# Patient Record
Sex: MALE | Race: WHITE | URBAN - METROPOLITAN AREA
[De-identification: names, ages, dates, MRNs, and addresses within clinical notes are randomized per-mention and may not be internally consistent; named-entity substitution may affect disease eponyms.]

---

## 2019-07-22 ENCOUNTER — OFFICE VISIT (OUTPATIENT)
Dept: PULMONOLOGY | Facility: CLINIC | Age: 44
End: 2019-07-22

## 2019-07-22 VITALS
SYSTOLIC BLOOD PRESSURE: 135 MMHG | DIASTOLIC BLOOD PRESSURE: 90 MMHG | HEART RATE: 80 BPM | HEIGHT: 71 IN | OXYGEN SATURATION: 98 % | RESPIRATION RATE: 18 BRPM | WEIGHT: 255 LBS | TEMPERATURE: 97.6 F | BODY MASS INDEX: 35.7 KG/M2

## 2019-07-22 DIAGNOSIS — E66.01 SEVERE OBESITY (HCC): ICD-10-CM

## 2019-07-22 DIAGNOSIS — J45.909 ASTHMA, UNSPECIFIED ASTHMA SEVERITY, UNSPECIFIED WHETHER COMPLICATED, UNSPECIFIED WHETHER PERSISTENT: Primary | ICD-10-CM

## 2019-07-22 DIAGNOSIS — G47.33 OSA (OBSTRUCTIVE SLEEP APNEA): ICD-10-CM

## 2019-07-22 RX ORDER — TRIAMCINOLONE ACETONIDE 1 MG/G
OINTMENT TOPICAL
COMMUNITY
Start: 2012-11-09 | End: 2022-09-12 | Stop reason: ALTCHOICE

## 2019-07-22 RX ORDER — IBUPROFEN 200 MG
800 TABLET ORAL
COMMUNITY

## 2019-07-22 RX ORDER — RANITIDINE 300 MG/1
TABLET ORAL 2 TIMES DAILY
COMMUNITY
End: 2022-09-12 | Stop reason: ALTCHOICE

## 2019-07-22 RX ORDER — ERGOCALCIFEROL 1.25 MG/1
CAPSULE ORAL
Refills: 1 | COMMUNITY
Start: 2019-07-15 | End: 2022-09-12 | Stop reason: ALTCHOICE

## 2019-07-22 RX ORDER — INSULIN DEGLUDEC INJECTION 200 U/ML
INJECTION, SOLUTION SUBCUTANEOUS
Refills: 2 | COMMUNITY
Start: 2019-06-25 | End: 2022-09-12 | Stop reason: ALTCHOICE

## 2019-07-22 RX ORDER — METFORMIN HYDROCHLORIDE 1000 MG/1
TABLET ORAL
Refills: 0 | COMMUNITY
Start: 2019-06-20 | End: 2022-09-12 | Stop reason: ALTCHOICE

## 2019-07-22 RX ORDER — SIMVASTATIN 40 MG/1
TABLET, FILM COATED ORAL
Refills: 0 | COMMUNITY
Start: 2019-06-20 | End: 2022-09-12 | Stop reason: SDUPTHER

## 2019-07-22 RX ORDER — ALBUTEROL SULFATE 90 UG/1
2 AEROSOL, METERED RESPIRATORY (INHALATION)
COMMUNITY
Start: 2014-01-03 | End: 2022-09-12 | Stop reason: SDUPTHER

## 2019-07-22 RX ORDER — BUPROPION HYDROCHLORIDE 150 MG/1
TABLET ORAL
Refills: 4 | COMMUNITY
Start: 2019-06-25 | End: 2022-09-12 | Stop reason: SDUPTHER

## 2019-07-22 RX ORDER — LISINOPRIL AND HYDROCHLOROTHIAZIDE 20; 25 MG/1; MG/1
TABLET ORAL
Refills: 0 | COMMUNITY
Start: 2019-06-20 | End: 2022-09-12 | Stop reason: ALTCHOICE

## 2019-07-22 NOTE — PROGRESS NOTES
[facility]  Pulmonary, Critical Care, and Sleep Medicine       Pulmonary Office Progress Notes        Subjective: The patient presents for evaluation of probable obstructive pulmonary disease and obstructive sleep apnea. History  The patient is a 27-year-old male that started smoking at age 13. He continues to smoke to the present day. Over most of these 32 years he smoked 1 pack/day. He started using his first inhaler at age 13. If he was sick, all he used was an albuterol nebulizer. He was briefly on inhaled steroid, but did not care for the way that it tasted. He stopped it. He has been sick with a lower respiratory tract infection requiring prednisone or similar therapy about once every 6 years. He is currently quite content with just using albuterol as needed. He denies any cough, purulent sputum production or hemoptysis. He occasionally wheezes. Triggers for shortness of breath include pollens, irritants, and changes in the weather. He does have frequent upper respiratory tract congestion that appears to be allergy driven. He denies a regular sore throat. He has no chest pains or dizziness. Denies any gastrointestinal or genitourinary tract complaints. He has no lower extremity edema. He pays close attention to his feet and legs because of his history of diabetes. He works as a  and is on his feet for several hours a day. He denies any shortness of breath despite walking several miles. He does not own a car and walks wherever he needs to go. He denies any orthopnea or PND. He does waken regularly at night. He has obstructive sleep apnea and recalls being told that he has 68 events per hour. At one point he did not breathe for 30 seconds. He does not have his machine because he wore it only 3 hours and 45 minutes at night and his insurance company refused to pay for the device.   The issue sounds to be more 1 of machine comfort than an unwillingness to use the device    Review of systems  Denies any neurologic deficits. He has no palpable adenopathy. He has occasional itching of his feet when his glucose is poorly controlled. He has no fevers or night sweats or unexplained weight loss. The review was completed in its entirety and is otherwise normal.    Past medical history  Tobacco abuse  Hypertension  Diabetes mellitus  Obesity  Hypercholesterolemia  Asthma  Depression    Current Outpatient Medications on File Prior to Visit   Medication Sig Dispense Refill    metFORMIN (GLUCOPHAGE) 1,000 mg tablet TAKE 1 TABLET BY MOUTH TWICE DAILY  0    TRESIBA FLEXTOUCH U-200 200 unit/mL (3 mL) inpn INJECT 80 100 UNITS SUBCUTANEOUSLY AT BEDTIME. ADJUST AS DIRECTED  2    lisinopril-hydroCHLOROthiazide (PRINZIDE, ZESTORETIC) 20-25 mg per tablet TAKE 1 TABLET BY MOUTH ONCE DAILY FOR BLOOD PRESSURE  0    simvastatin (ZOCOR) 40 mg tablet TAKE 1 TABLET BY MOUTH AT BEDTIME FOR CHOLESTEROL  0    buPROPion XL (WELLBUTRIN XL) 150 mg tablet TAKE 1 TABLET BY MOUTH ONCE DAILY IN THE MORNING  4    ergocalciferol (ERGOCALCIFEROL) 50,000 unit capsule TAKE 1 CAPSULE BY MOUTH EVERY WEEK  1    albuterol (PROVENTIL HFA) 90 mcg/actuation inhaler Take 2 Puffs by inhalation.  triamcinolone acetonide (KENALOG) 0.1 % ointment Apply  to affected area.  semaglutide (OZEMPIC) 0.25 mg/0.2 mL (2 mg/1.5 mL) sub-q pen by SubCUTAneous route every seven (7) days.  raNITIdine (ZANTAC) 300 mg tab Take  by mouth two (2) times a day.  ibuprofen (MOTRIN) 200 mg tablet Take 800 mg by mouth every six (6) hours as needed for Pain. No current facility-administered medications on file prior to visit. Social history  Works as a . Does not own a car and walks wherever he needs to go. He has a 32-pack-year history of smoking. Family history  His daughter has asthma.        Objective:     He is alert, oriented and in no respiratory distress at rest. Affect is normal  Blood pressure 135/90, pulse 80, temperature 97.6 °F (36.4 °C), temperature source Oral, resp. rate 18, height 5' 11\" (1.803 m), weight 115.7 kg (255 lb), SpO2 98 %. Extraocular muscles are intact. Gaze is conjugate. Oral mucosa is moist.  Dentition is in good repair  Neck is supple and there is no lymphadenopathy  Breath sounds are diminished but clear. Heart with regular rate and rhythm. No appreciable murmur or gallop, but tones are distant. Sclera anicteric. Obese and intra-abdominal contents cannot be reliably palpated  No cyanosis, clubbing or edema  No facial rash or changes of the hands or wrists concerning for an inflammatory arthropathy    Spirometry 7/22/2019  The FEV1 pre-bronchodilators is normal at 3.40 L or 81% predicted. There is no change post bronchodilators. The FVC post bronchodilators is 4.82 L or 90% predicted. The FEV1/FVC ratio is normal.  The FEF 25-75% is reduced to 64%. There is 3% improvement post bronchodilators. Flow volume loop is normal.    Chest x-ray from 7/18/2019 on the lateral view shows a flattened diaphragm with increased retrosternal airspace. The PA view shows a normal-sized heart. The costophrenic angles appear sharp. There are no clear-cut abnormalities, although there may be slightly nodular infiltrate in the right lower lobe. Assessment  Asthma  Daily use of rescue inhaler  On no controlling medication  Obstructive sleep apnea off therapy due to patient removing the mask while sleeping  Tobacco abuse  Allergic rhinitis  Metabolic syndrome    Although the patient's respiratory symptoms could use further controlling therapies, I do not believe that he is willing to add medications. He does appear to be motivated to treat his obstructive sleep apnea. If postnasal drip appears to cause difficulties with controlling his asthma symptoms or using an AutoPAP machine, he should start a nasal steroid.     Plan:  AutoPap  Continue albuterol  Hold ICS due to patient request  Encourage smoking cessation. Currently the patient does not appear to be sufficiently motivated to stop    The patient will return to clinic in 6 weeks.

## 2019-07-22 NOTE — PROGRESS NOTES
Verbal Order with read back per Laura Monae MD  For PFT smart panel. AMB POC PFT complete w/ bronchodilator  AMB POC PFT complete w/o bronchodilator    Dr. Chano Hill MD will co-sign the orders.

## 2022-03-19 PROBLEM — E66.01 SEVERE OBESITY (HCC): Status: ACTIVE | Noted: 2019-07-22

## 2022-09-01 NOTE — PROGRESS NOTES
Mary Faye (: 1975) is a 48 y.o. male, NEW TO PROVIDER, here to establish care for:    ICD-10-CM ICD-9-CM   1. Establishing care with new doctor, encounter for  Z76.89 V65.8   2. HTN, goal below 130/80  I10 401.9   3. Dyslipidemia, goal LDL below 70  E78.5 272.4   4. Need for hepatitis C screening test  Z11.59 V73.89   5. Cough  R05.9 786.2   6. Shortness of breath  R06.02 786.05   7. MARY (obstructive sleep apnea)  G47.33 327.23   8. Annual physical exam  Z00.00 V70.0   9. Insulin dependent type 2 diabetes mellitus (HCC)  E11.9 250.00    Z79.4 V58.67   10. Moderate episode of recurrent major depressive disorder (HCC)  F33.1 296.32   11. Moderate persistent asthma without complication  K38.80 574.08   12. Tobacco use disorder  F17.200 305.1     Assessment   Plan     #Ran out of meds and has been without care or treatment ~2 years    #HTN - at goal, not on treatment  BP cuff at home? No   Given well controlled without treatment, will monitor ; Previously on Lisinopril-HCTZ 20-25mg without issue    BP Readings from Last 3 Encounters:   22 104/69   19 135/90     Key CAD CHF Meds               simvastatin (ZOCOR) 40 mg tablet (Taking) TAKE 1 TABLET BY MOUTH AT BEDTIME FOR CHOLESTEROL          #HLD - Goal LDL < 70  Will restart prior treatment of Simvastatin 40mg  Unclear control, will check levels    #IDDM2 - Goal A1C <7  Reports last A1C 10.4 (2 years ago). Unclear control, will check levels  Glucometer? Has at home; Average 200-300  Hypoglycemia? Denies  Agreeable to restart GLP agonist, Long acting insulin, and Metformin with plan to titrate up GLP and Insulin at 1mo follow up; Could not remember prior insulin dosing so plan for 10units qHS  Encouraged to have annual foot exam to r/o peripheral neuropathy. Encouraged to update dilated eye exam annually to r/o retinopathy.     Key Antihyperglycemic Medications               insulin glargine (LANTUS,BASAGLAR) 100 unit/mL (3 mL) inpn (Taking) 10 Units by SubCUTAneous route nightly. Indications: type 2 diabetes mellitus    metFORMIN ER (GLUCOPHAGE XR) 500 mg tablet (Taking) Take 2 Tablets by mouth daily (with dinner). Indications: type 2 diabetes mellitus    semaglutide (OZEMPIC) 0.25 mg or 0.5 mg/dose (2 mg/1.5 ml) subq pen (Taking) 0.25 mg by SubCUTAneous route every seven (7) days. Indications: type 2 diabetes mellitus          #? Mild persistent asthma - uncontrolled vs COPD  With #Tobacco use disorder - contemplative  Reviewed that the best rates of smoking cessation success are found with a combination of behavioral changes, medications, and nicotine replacement therapy. Encouraged to follow up for assistance if interested. In review of medications, reports using rescue inhaler three times daily. Reviewed optimal use of preventative and rescue inhalers. Reviewed to follow up if use of rescue inhaler (albuterol) continues to be twice weekly or more. Agreeable to start ICS, will titrate up to reach goal     Key COPD Medications               albuterol (PROVENTIL HFA, VENTOLIN HFA, PROAIR HFA) 90 mcg/actuation inhaler (Taking) Take 2 Puffs by inhalation every four to six (4-6) hours as needed for Wheezing or Shortness of Breath. budesonide (PULMICORT FLEXHALER) 90 mcg/actuation aepb inhaler (Taking) Take 1 Puff by inhalation two (2) times a day. Max 1,000 mcg/day  Indications: maintenance breathing treatment          #Hx of MARY severe in need of CPAP  Reviewed signs/sx indicating evaluation for sleep study needed and agreeable to referral, ordered 9/12/22. Discussed treatment options including weight loss efforts up to use of CPAP if indicated. #MDD with #KRYSTIAN  Current regimen and adjustments: Reviewed goal to avoid changing multiple psychiatric medications at a time to determine which are helping/having AE. Interested in restarting Wellbutrin to reduce frequency/severity of symptoms.  Reviewed risk/benefit of both preventative daily treatment and rescue (prn) medication. Reviewed that it could take 4-6wks before improvement noted after initiation of preventative daily medication and likely need to increase dose if tolerating well to reach therapeutic level/treatment goals. Counseled on nonpharmacologic interventions that could improve outcomes as well including exercise, therapy/counseling, and self care. Encouraged to establish care with Psychiatry services for counseling. HM:  Lung CA screening indicated at 49y/o - Reviewed risk/benefit of annual screening for lung cancer with low-dose computed tomography in adults aged 48 to 80 years who have a 20 pack-year smoking history and currently smoke or have quit within the past 15 years. Patient agreeable to screening          Orders Placed This Encounter    XR CHEST PA LAT     Standing Status:   Future     Number of Occurrences:   1     Standing Expiration Date:   9/12/2023     Order Specific Question:   Reason for Exam     Answer:   chronic cough, SOB     Order Specific Question:   Which facility to perform procedure?      Answer:   [facility]    HEPATITIS C AB     Standing Status:   Future     Standing Expiration Date:   9/1/2023    HEMOGLOBIN A1C WITH EAG     Standing Status:   Future     Standing Expiration Date:   9/12/2023    CBC WITH AUTOMATED DIFF     Standing Status:   Future     Standing Expiration Date:   1/91/1433    METABOLIC PANEL, COMPREHENSIVE     Standing Status:   Future     Standing Expiration Date:   9/12/2023    T4, FREE     Standing Status:   Future     Standing Expiration Date:   9/12/2023    LIPID PANEL     Standing Status:   Future     Standing Expiration Date:   9/12/2023    TSH 3RD GENERATION     Standing Status:   Future     Standing Expiration Date:   9/12/2023    VITAMIN D, 25 HYDROXY     Standing Status:   Future     Standing Expiration Date:   9/12/2023    SLEEP MEDICINE REFERRAL     Referral Priority:   Routine     Referral Type:   Consultation     Referral Reason:   Specialty Services Required     Number of Visits Requested:   1    insulin glargine (LANTUS,BASAGLAR) 100 unit/mL (3 mL) inpn     Sig: 10 Units by SubCUTAneous route nightly. Indications: type 2 diabetes mellitus     Dispense:  3 Adjustable Dose Pre-filled Pen Syringe     Refill:  0     Will adjust next Rx; Restarting insulin    metFORMIN ER (GLUCOPHAGE XR) 500 mg tablet     Sig: Take 2 Tablets by mouth daily (with dinner). Indications: type 2 diabetes mellitus     Dispense:  180 Tablet     Refill:  0     Will increase to 2000mg daily with refill    semaglutide (OZEMPIC) 0.25 mg or 0.5 mg/dose (2 mg/1.5 ml) subq pen     Si.25 mg by SubCUTAneous route every seven (7) days. Indications: type 2 diabetes mellitus     Dispense:  1 Box     Refill:  0     Will increase dose with next refill    albuterol (PROVENTIL HFA, VENTOLIN HFA, PROAIR HFA) 90 mcg/actuation inhaler     Sig: Take 2 Puffs by inhalation every four to six (4-6) hours as needed for Wheezing or Shortness of Breath. Dispense:  18 g     Refill:  5    simvastatin (ZOCOR) 40 mg tablet     Sig: TAKE 1 TABLET BY MOUTH AT BEDTIME FOR CHOLESTEROL     Dispense:  90 Tablet     Refill:  3    buPROPion XL (WELLBUTRIN XL) 150 mg tablet     Sig: Take 1 Tablet by mouth daily. in the morning  Indications: major depressive disorder, stop smoking     Dispense:  90 Tablet     Refill:  1    budesonide (PULMICORT FLEXHALER) 90 mcg/actuation aepb inhaler     Sig: Take 1 Puff by inhalation two (2) times a day. Max 1,000 mcg/day  Indications: maintenance breathing treatment     Dispense:  1 Each     Refill:  0     New rx, may adjust dose with next refill     Return in about 1 month (around 10/12/2022) for 30 min follow up, BP check, review results, adjust diabetes meds . Subjective   Extensive review of medical history and medications completed to facilitate transfer of care.         Current Outpatient Medications   Medication Instructions    albuterol (PROVENTIL HFA, VENTOLIN HFA, PROAIR HFA) 90 mcg/actuation inhaler 2 Puffs, Inhalation, EVERY 4-6 HOURS PRN    budesonide (PULMICORT FLEXHALER) 90 mcg/actuation aepb inhaler 1 Puff, Inhalation, 2 TIMES DAILY, Max 1,000 mcg/day    buPROPion XL (WELLBUTRIN XL) 150 mg, Oral, DAILY, in the morning    ibuprofen (MOTRIN) 800 mg, Oral, EVERY 6 HOURS AS NEEDED    insulin glargine (LANTUS,BASAGLAR) 10 Units, SubCUTAneous, EVERY BEDTIME    metFORMIN ER (GLUCOPHAGE XR) 1,000 mg, Oral, DAILY WITH DINNER    semaglutide (OZEMPIC) 0.25 mg, SubCUTAneous, EVERY 7 DAYS    simvastatin (ZOCOR) 40 mg tablet TAKE 1 TABLET BY MOUTH AT BEDTIME FOR CHOLESTEROL      Allergies   Allergen Reactions    Milk Containing Products Other (comments)     Known since a child, no known reaction      Objective   Visit Vitals  /69 (BP 1 Location: Right arm, BP Patient Position: Sitting, BP Cuff Size: Adult)   Pulse 96   Temp 97.3 °F (36.3 °C) (Temporal)   Resp 18   Ht 5' 11\" (1.803 m)   Wt 212 lb 12.8 oz (96.5 kg)   SpO2 96%   BMI 29.68 kg/m²     Physical Exam  Vitals and nursing note reviewed. Constitutional:       General: He is not in acute distress. Interventions: Face mask in place. Cardiovascular:      Rate and Rhythm: Normal rate. Pulses: Normal pulses. Pulmonary:      Effort: Pulmonary effort is normal. No respiratory distress. Neurological:      Mental Status: He is alert and oriented to person, place, and time.       Gait: Gait normal.   Psychiatric:         Mood and Affect: Mood normal.         Behavior: Behavior normal.          Moshe Mohan DO  Family Medicine  09/12/2022

## 2022-09-12 ENCOUNTER — HOSPITAL ENCOUNTER (OUTPATIENT)
Dept: LAB | Age: 47
Discharge: HOME | End: 2022-09-12
Payer: COMMERCIAL

## 2022-09-12 ENCOUNTER — APPOINTMENT (OUTPATIENT)
Dept: FAMILY MEDICINE CLINIC | Age: 47
End: 2022-09-12

## 2022-09-12 ENCOUNTER — OFFICE VISIT (OUTPATIENT)
Dept: FAMILY MEDICINE CLINIC | Age: 47
End: 2022-09-12
Payer: COMMERCIAL

## 2022-09-12 VITALS
WEIGHT: 212.8 LBS | DIASTOLIC BLOOD PRESSURE: 69 MMHG | HEART RATE: 96 BPM | HEIGHT: 71 IN | BODY MASS INDEX: 29.79 KG/M2 | OXYGEN SATURATION: 96 % | TEMPERATURE: 97.3 F | RESPIRATION RATE: 18 BRPM | SYSTOLIC BLOOD PRESSURE: 104 MMHG

## 2022-09-12 DIAGNOSIS — Z79.4 INSULIN DEPENDENT TYPE 2 DIABETES MELLITUS (HCC): ICD-10-CM

## 2022-09-12 DIAGNOSIS — R06.02 SHORTNESS OF BREATH: ICD-10-CM

## 2022-09-12 DIAGNOSIS — E11.9 INSULIN DEPENDENT TYPE 2 DIABETES MELLITUS (HCC): ICD-10-CM

## 2022-09-12 DIAGNOSIS — J45.40 MODERATE PERSISTENT ASTHMA WITHOUT COMPLICATION: ICD-10-CM

## 2022-09-12 DIAGNOSIS — I10 HTN, GOAL BELOW 130/80: ICD-10-CM

## 2022-09-12 DIAGNOSIS — F17.200 TOBACCO USE DISORDER: ICD-10-CM

## 2022-09-12 DIAGNOSIS — Z00.00 ANNUAL PHYSICAL EXAM: ICD-10-CM

## 2022-09-12 DIAGNOSIS — E78.5 DYSLIPIDEMIA, GOAL LDL BELOW 70: ICD-10-CM

## 2022-09-12 DIAGNOSIS — Z76.89 ESTABLISHING CARE WITH NEW DOCTOR, ENCOUNTER FOR: Primary | ICD-10-CM

## 2022-09-12 DIAGNOSIS — G47.33 OSA (OBSTRUCTIVE SLEEP APNEA): ICD-10-CM

## 2022-09-12 DIAGNOSIS — Z11.59 NEED FOR HEPATITIS C SCREENING TEST: ICD-10-CM

## 2022-09-12 DIAGNOSIS — F33.1 MODERATE EPISODE OF RECURRENT MAJOR DEPRESSIVE DISORDER (HCC): ICD-10-CM

## 2022-09-12 DIAGNOSIS — R05.9 COUGH: ICD-10-CM

## 2022-09-12 LAB
25(OH)D3 SERPL-MCNC: 15.9 NG/ML (ref 30–100)
ALBUMIN SERPL-MCNC: 4.1 G/DL (ref 3.4–5)
ALBUMIN/GLOB SERPL: 1.2 {RATIO} (ref 0.8–1.7)
ALP SERPL-CCNC: 70 U/L (ref 45–117)
ALT SERPL-CCNC: 26 U/L (ref 16–61)
ANION GAP SERPL CALC-SCNC: 7 MMOL/L (ref 3–18)
AST SERPL-CCNC: 13 U/L (ref 10–38)
BASOPHILS # BLD: 0.1 K/UL (ref 0–0.1)
BASOPHILS NFR BLD: 2 % (ref 0–2)
BILIRUB SERPL-MCNC: 1 MG/DL (ref 0.2–1)
BUN SERPL-MCNC: 15 MG/DL (ref 7–18)
BUN/CREAT SERPL: 10 (ref 12–20)
CALCIUM SERPL-MCNC: 9.9 MG/DL (ref 8.5–10.1)
CHLORIDE SERPL-SCNC: 100 MMOL/L (ref 100–111)
CHOLEST SERPL-MCNC: 234 MG/DL
CO2 SERPL-SCNC: 27 MMOL/L (ref 21–32)
CREAT SERPL-MCNC: 1.52 MG/DL (ref 0.6–1.3)
DIFFERENTIAL METHOD BLD: ABNORMAL
EOSINOPHIL # BLD: 0.5 K/UL (ref 0–0.4)
EOSINOPHIL NFR BLD: 5 % (ref 0–5)
ERYTHROCYTE [DISTWIDTH] IN BLOOD BY AUTOMATED COUNT: 12.1 % (ref 11.6–14.5)
EST. AVERAGE GLUCOSE BLD GHB EST-MCNC: 252 MG/DL
GLOBULIN SER CALC-MCNC: 3.5 G/DL (ref 2–4)
GLUCOSE SERPL-MCNC: 380 MG/DL (ref 74–99)
HBA1C MFR BLD: 10.4 % (ref 4.2–5.6)
HCT VFR BLD AUTO: 54.6 % (ref 36–48)
HDLC SERPL-MCNC: 39 MG/DL (ref 40–60)
HDLC SERPL: 6 {RATIO} (ref 0–5)
HGB BLD-MCNC: 18.3 G/DL (ref 13–16)
IMM GRANULOCYTES # BLD AUTO: 0.1 K/UL (ref 0–0.04)
IMM GRANULOCYTES NFR BLD AUTO: 1 % (ref 0–0.5)
LDLC SERPL CALC-MCNC: 122.8 MG/DL (ref 0–100)
LIPID PROFILE,FLP: ABNORMAL
LYMPHOCYTES # BLD: 1.8 K/UL (ref 0.9–3.6)
LYMPHOCYTES NFR BLD: 19 % (ref 21–52)
MCH RBC QN AUTO: 32.3 PG (ref 24–34)
MCHC RBC AUTO-ENTMCNC: 33.5 G/DL (ref 31–37)
MCV RBC AUTO: 96.3 FL (ref 78–100)
MONOCYTES # BLD: 0.8 K/UL (ref 0.05–1.2)
MONOCYTES NFR BLD: 9 % (ref 3–10)
NEUTS SEG # BLD: 6.3 K/UL (ref 1.8–8)
NEUTS SEG NFR BLD: 65 % (ref 40–73)
NRBC # BLD: 0 K/UL (ref 0–0.01)
NRBC BLD-RTO: 0 PER 100 WBC
PLATELET # BLD AUTO: 255 K/UL (ref 135–420)
PMV BLD AUTO: 10.2 FL (ref 9.2–11.8)
POTASSIUM SERPL-SCNC: 5 MMOL/L (ref 3.5–5.5)
PROT SERPL-MCNC: 7.6 G/DL (ref 6.4–8.2)
RBC # BLD AUTO: 5.67 M/UL (ref 4.35–5.65)
SODIUM SERPL-SCNC: 134 MMOL/L (ref 136–145)
T4 FREE SERPL-MCNC: 1.1 NG/DL (ref 0.7–1.5)
TRIGL SERPL-MCNC: 361 MG/DL (ref ?–150)
TSH SERPL DL<=0.05 MIU/L-ACNC: 1.13 UIU/ML (ref 0.36–3.74)
VLDLC SERPL CALC-MCNC: 72.2 MG/DL
WBC # BLD AUTO: 9.6 K/UL (ref 4.6–13.2)

## 2022-09-12 PROCEDURE — 86803 HEPATITIS C AB TEST: CPT

## 2022-09-12 PROCEDURE — 99204 OFFICE O/P NEW MOD 45 MIN: CPT | Performed by: STUDENT IN AN ORGANIZED HEALTH CARE EDUCATION/TRAINING PROGRAM

## 2022-09-12 PROCEDURE — 84439 ASSAY OF FREE THYROXINE: CPT

## 2022-09-12 PROCEDURE — 85025 COMPLETE CBC W/AUTO DIFF WBC: CPT

## 2022-09-12 PROCEDURE — 84443 ASSAY THYROID STIM HORMONE: CPT

## 2022-09-12 PROCEDURE — 83036 HEMOGLOBIN GLYCOSYLATED A1C: CPT

## 2022-09-12 PROCEDURE — 36415 COLL VENOUS BLD VENIPUNCTURE: CPT

## 2022-09-12 PROCEDURE — 82306 VITAMIN D 25 HYDROXY: CPT

## 2022-09-12 PROCEDURE — 80061 LIPID PANEL: CPT

## 2022-09-12 PROCEDURE — 80053 COMPREHEN METABOLIC PANEL: CPT

## 2022-09-12 RX ORDER — METFORMIN HYDROCHLORIDE 500 MG/1
1000 TABLET, EXTENDED RELEASE ORAL
Qty: 180 TABLET | Refills: 0 | Status: SHIPPED | OUTPATIENT
Start: 2022-09-12 | End: 2022-10-13 | Stop reason: DRUGHIGH

## 2022-09-12 RX ORDER — SIMVASTATIN 40 MG/1
TABLET, FILM COATED ORAL
Qty: 90 TABLET | Refills: 3 | Status: SHIPPED | OUTPATIENT
Start: 2022-09-12

## 2022-09-12 RX ORDER — ALBUTEROL SULFATE 90 UG/1
2 AEROSOL, METERED RESPIRATORY (INHALATION)
Qty: 18 G | Refills: 5 | Status: SHIPPED | OUTPATIENT
Start: 2022-09-12

## 2022-09-12 RX ORDER — INSULIN GLARGINE 100 [IU]/ML
10 INJECTION, SOLUTION SUBCUTANEOUS
Qty: 3 ADJUSTABLE DOSE PRE-FILLED PEN SYRINGE | Refills: 0 | Status: SHIPPED | OUTPATIENT
Start: 2022-09-12 | End: 2022-10-20 | Stop reason: SDUPTHER

## 2022-09-12 RX ORDER — BUPROPION HYDROCHLORIDE 150 MG/1
150 TABLET ORAL DAILY
Qty: 90 TABLET | Refills: 1 | Status: SHIPPED | OUTPATIENT
Start: 2022-09-12

## 2022-09-12 NOTE — PATIENT INSTRUCTIONS
The Big Picture: Checking Your Blood Sugar    Blood sugar (blood glucose) monitoring is the primary tool you have to find out if your blood glucose levels are within your target range. This tells you your blood glucose level at any one time. Its important for blood sugar levels to stay in a healthy range. If glucose levels get too low, we can lose the ability to think and function normally. If they get too high and stay high, it can cause damage or complications to the body over the course of many years. The logging of your results is vital. When you bring your log to your healthcare provider, youll  have a good picture of your body's response to your diabetes care plan. To help keep track of your levels, we have a printable blood glucose log. We also have a blood glucose log available for purchase that is smaller so you can carry it with you. Who should check? Talk to your doctor about whether you should be checking your blood glucose. People who may benefit from checking blood glucose regularly include those:  taking insulin. who are pregnant.  having a hard time controlling blood sugar levels. having low blood sugar levels. having low blood sugar levels without the usual warning signs. have ketones from high blood sugar levels. Other tips for checking:    With some meters, you can also use your forearm, thigh, or fleshy part of your hand. There are spring-loaded lancing devices that make sticking yourself less painful. If you use your fingertip, stick the side of your fingertip by your fingernail to avoid having sore spots on the frequently used part of your finger. What are the target ranges? A1C targets differ based on age and health.  Also, more or less stringent glycemic goals may be appropriate for each individual. Blood glucose targets are individualized based on:  duration of diabetes  age/life expectancy  conditions a person may have  cardiovascular disease or diabetes complications  hypoglycemia unawareness  individual patient considerations    The American Diabetes Association suggests the following targets for most nonpregnant adults with diabetes:    A1C: Less than 7%. Before a meal (preprandial plasma glucose):  mg/dL    1-2 hours after beginning of the meal (postprandial plasma glucose)*: Less than 180 mg/dL    What do my results mean? When you finish the blood sugar check, write down your results and note what factors may have affected them, such as food, activity, and stress. Take a close look at your blood glucose record to see if your level is too high or too low several days in a row at about the same time. If the same thing keeps happening, it might be time to change your diabetes care plan. Work with your doctor or diabetes educator to learn what your results mean for you. It can take time to make adjustments and get things just right. And do ask your doctor if you should report results out of a certain range right away by phone. Keep in mind that blood glucose results often trigger strong feelings. Blood sugar numbers can leave you upset, confused, frustrated, angry, or down. It's easy to use the numbers to  yourself.  Remind yourself that tracking your blood sugar level is simply a way to know how well your diabetes care plan is working, and whether that plan may need to change. : )

## 2022-09-12 NOTE — PROGRESS NOTES
Michael Davidson is a 48 y.o. male (: 1975) presenting to address:    Chief Complaint   Patient presents with    Establish Care       Vitals:    22 1256   BP: 104/69   Pulse: 96   Resp: 18   Temp: 97.3 °F (36.3 °C)   TempSrc: Temporal   SpO2: 96%   Weight: 212 lb 12.8 oz (96.5 kg)   Height: 5' 11\" (1.803 m)   PainSc:   0 - No pain       Hearing/Vision:   No results found. Learning Assessment:     Learning Assessment 2022   PRIMARY LEARNER Patient   HIGHEST LEVEL OF EDUCATION - PRIMARY LEARNER  SOME COLLEGE   BARRIERS PRIMARY LEARNER NONE   CO-LEARNER CAREGIVER No   PRIMARY LANGUAGE ENGLISH   LEARNER PREFERENCE PRIMARY DEMONSTRATION   ANSWERED BY self   RELATIONSHIP SELF     Depression Screening:     3 most recent PHQ Screens 2022   Little interest or pleasure in doing things Not at all   Feeling down, depressed, irritable, or hopeless Several days   Total Score PHQ 2 1   Trouble falling or staying asleep, or sleeping too much Nearly every day   Feeling tired or having little energy Not at all   Poor appetite, weight loss, or overeating More than half the days   Feeling bad about yourself - or that you are a failure or have let yourself or your family down Several days   Trouble concentrating on things such as school, work, reading, or watching TV Not at all   Moving or speaking so slowly that other people could have noticed; or the opposite being so fidgety that others notice Not at all   Thoughts of being better off dead, or hurting yourself in some way Not at all   PHQ 9 Score 7   How difficult have these problems made it for you to do your work, take care of your home and get along with others Not difficult at all     Fall Risk Assessment:     Fall Risk Assessment, last 12 mths 2022   Able to walk? Yes   Fall in past 12 months? 0   Do you feel unsteady?  0   Are you worried about falling 0     Abuse Screening:     Abuse Screening Questionnaire 2022   Do you ever feel afraid of your partner? N   Are you in a relationship with someone who physically or mentally threatens you? N   Is it safe for you to go home? Y     ADL Assessment:   No flowsheet data found. Coordination of Care Questionaire:   1. \"Have you been to the ER, urgent care clinic since your last visit? Hospitalized since your last visit? \" No    2. \"Have you seen or consulted any other health care providers outside of the [address] [place] [address] since your last visit? \" No     3. For patients aged 39-70: Has the patient had a colonoscopy? No     Advanced Directive:   1. Do you have an Advanced Directive? NO    2. Would you like information on Advanced Directives?  NO

## 2022-09-13 ENCOUNTER — TELEPHONE (OUTPATIENT)
Dept: FAMILY MEDICINE CLINIC | Age: 47
End: 2022-09-13

## 2022-09-13 LAB
HCV AB SER IA-ACNC: <0.02 INDEX
HCV AB SERPL QL IA: NEGATIVE
HCV COMMENT,HCGAC: NORMAL

## 2022-09-13 NOTE — TELEPHONE ENCOUNTER
Received a fax request from the pt's pharmacy for PA. PA has been initiated and sent to plan through [employer].

## 2022-09-22 NOTE — PROGRESS NOTES
Will review in detail at follow up:     Future Appointments  10/13/2022 10:30 AM   Aleida Bedoya, DO      [facility]                [facility]    A1C similar to 2yrs ago, plan to titrate up metformin and GLP if tolerated in Oct  Polycythemia   CKD 3A (should consider SGLT2 inhibitor);  Will caution against NSAIDs  The 10-year ASCVD risk score (Brett CARRINGTON, et al., 2019) is: 14.5%  Vit D def - offered supplement

## 2022-10-10 NOTE — PROGRESS NOTES
Vinicio Mckeon (: 1975) is a 48 y.o. male, ESTABLISHED patient, here for FOLLOW UP:    ICD-10-CM ICD-9-CM   1. Insulin dependent type 2 diabetes mellitus (HCC)  E11.9 250.00    Z79.4 V58.67   2. Moderate persistent asthma without complication  B19.18 695.64   3. Underinsured  Z59.89 V60.89   4. Hives  L50.9 708.9   5. Stress due to illness of family member  Z63.79 V61.49   10. HTN, goal below 130/80  I10 401.9   7. Dyslipidemia, goal LDL below 70  E78.5 272.4   8. Moderate episode of recurrent major depressive disorder (HCC)  F33.1 296.32   9. Tobacco use disorder  F17.200 305.1     Assessment   Plan     #Stress due to family illness  Daughter in rehab     #Hives on upper back   Has Benadryl and triamcinolone at home and reports is responding well    #HTN - Goal BP <130/80; at goal, not on treatment  BP cuff at home? No   Given well controlled without treatment, will monitor ; Previously on Lisinopril-HCTZ 20-25mg without issue    BP Readings from Last 3 Encounters:   10/13/22 119/69   22 104/69   19 135/90     #HLD - Goal LDL < 70  Tolerating medications without notable AE, will continue regimen unchanged    Lab Results   Component Value Date/Time    LDL, calculated 122.8 (H) 2022 02:13 PM     The 10-year ASCVD risk score (Brett CARRINGTON, et al., 2019) is: 18.1%    Values used to calculate the score:      Age: 48 years      Sex: Male      Is Non- : No      Diabetic: Yes      Tobacco smoker: Yes      Systolic Blood Pressure: 900 mmHg      Is BP treated: No      HDL Cholesterol: 39 MG/DL      Total Cholesterol: 234 MG/DL    #IDDM2 - Goal A1C <7  Reports last A1C 10.4 (2 years ago). Glucometer? Has at home; Average 200-250  Hypoglycemia? Denies  Restarted on Long acting insulin and Metformin without notable AE. Reviewed safe titration, 3unit/3day until goal, instructions provided. Unable to afford GLP 1 agonists at present, would retry after 2023 when [facility] renews. Could consider SGLT2 inhibitor depending on coverage. Encouraged to have annual foot exam to r/o peripheral neuropathy. Encouraged to update dilated eye exam annually to r/o retinopathy. Lab Results   Component Value Date/Time    Hemoglobin A1c 10.4 (H) 09/12/2022 02:13 PM     Key Antihyperglycemic Medications               metFORMIN (GLUCOPHAGE) 1,000 mg tablet (Taking) Take 1 Tablet by mouth two (2) times daily (with meals). Indications: type 2 diabetes mellitus    insulin glargine (LANTUS,BASAGLAR) 100 unit/mL (3 mL) inpn (Taking/Discontinued) 10 Units by SubCUTAneous route nightly. Indications: type 2 diabetes mellitus          #? Mild persistent asthma - uncontrolled vs COPD  With #Tobacco use disorder - contemplative  Reviewed that the best rates of smoking cessation success are found with a combination of behavioral changes, medications, and nicotine replacement therapy. Encouraged to follow up for assistance if interested. In review of medications, reports using rescue inhaler three times daily. Reviewed optimal use of preventative and rescue inhalers. Reviewed to follow up if use of rescue inhaler (albuterol) continues to be twice weekly or more. Agreeable to start ICS, will titrate up to reach goal; Was unable to afford Pulmicort, plan to see if Advair covered as alternative maintenance. Key COPD Medications               fluticasone-Salmeterol (ADVAIR HFA) 45-21 mcg/actuation inhaler (Taking) Take 2 Puffs by inhalation two (2) times a day. Indications: maintenance breathing treatment    albuterol (PROVENTIL HFA, VENTOLIN HFA, PROAIR HFA) 90 mcg/actuation inhaler (Taking) Take 2 Puffs by inhalation every four to six (4-6) hours as needed for Wheezing or Shortness of Breath. #Hx of MARY severe in need of CPAP  Reviewed signs/sx indicating evaluation for sleep study needed and agreeable to referral, ordered 9/12/22.  Discussed treatment options including weight loss efforts up to use of CPAP if indicated. #MDD with #KRYSTIAN  Current regimen and adjustments: Reviewed goal to avoid changing multiple psychiatric medications at a time to determine which are helping/having AE. Started on Wellbutrin and tolerating well. Counseled on nonpharmacologic interventions that could improve outcomes as well including exercise, therapy/counseling, and self care. Encouraged to establish care with Psychiatry services for counseling. HM:  Lung CA screening indicated at 51y/o - Reviewed risk/benefit of annual screening for lung cancer with low-dose computed tomography in adults aged 48 to 80 years who have a 20 pack-year smoking history and currently smoke or have quit within the past 15 years. Patient agreeable to screening          Orders Placed This Encounter    AMB POC URINE, MICROALBUMIN, SEMIQUANT (3 RESULTS)    fluticasone-Salmeterol (ADVAIR HFA) 45-21 mcg/actuation inhaler     Sig: Take 2 Puffs by inhalation two (2) times a day. Indications: maintenance breathing treatment     Dispense:  12 g     Refill:  2     Maximum dose: fluticasone propionate 920 mcg/day and salmeterol 84 mcg/day. metFORMIN (GLUCOPHAGE) 1,000 mg tablet     Sig: Take 1 Tablet by mouth two (2) times daily (with meals). Indications: type 2 diabetes mellitus     Dispense:  180 Tablet     Refill:  3    DISCONTD: dulaglutide (TRULICITY) 0.75 DU/7.9 mL sub-q pen     Sig: Initial: 0.75 mg once weekly; May increase to 1.5 mg once weekly after 4 to 8 weeks if needed to achieve glycemic goals. We can adjust the next Rx. Indications: type 2 diabetes mellitus     Dispense:  4 Pen     Refill:  1     Return in about 2 months (around 12/13/2022) for 30 min follow up, A1C check. Subjective   Last PCP visit: 9/12/2022  Since last visit:   Any changes in health, procedures, hospital visits, or specialist visits? Denies  Tolerating medications without adverse reactions? Reports good compliance with Rx without notable adverse effects. Current Outpatient Medications   Medication Instructions    albuterol (PROVENTIL HFA, VENTOLIN HFA, PROAIR HFA) 90 mcg/actuation inhaler 2 Puffs, Inhalation, EVERY 4-6 HOURS PRN    buPROPion XL (WELLBUTRIN XL) 150 mg, Oral, DAILY, in the morning    fluticasone-Salmeterol (ADVAIR HFA) 45-21 mcg/actuation inhaler 2 Puffs, Inhalation, 2 TIMES DAILY    ibuprofen (MOTRIN) 800 mg, Oral, EVERY 6 HOURS AS NEEDED    insulin glargine (LANTUS,BASAGLAR) 15 Units, SubCUTAneous, EVERY BEDTIME, If the morning fasting blood glucose is above target (>130), an increase of 3 units in the basal insulin dose should be made approximately every 3 days. If having hypoglycemia (values <80), hold at that basal insulin level and schedule follow up    metFORMIN (GLUCOPHAGE) 1,000 mg, Oral, 2 TIMES DAILY WITH MEALS    simvastatin (ZOCOR) 40 mg tablet TAKE 1 TABLET BY MOUTH AT BEDTIME FOR CHOLESTEROL      Objective   Visit Vitals  /69 (BP 1 Location: Right arm, BP Patient Position: Sitting, BP Cuff Size: Large adult)   Pulse 82   Temp 97 °F (36.1 °C) (Temporal)   Resp 16   Ht 5' 11\" (1.803 m)   Wt 223 lb (101.2 kg)   SpO2 96%   BMI 31.10 kg/m²     Physical Exam  Vitals and nursing note reviewed. Constitutional:       General: He is not in acute distress. Interventions: Face mask in place. Cardiovascular:      Rate and Rhythm: Normal rate. Pulses: Normal pulses. Pulmonary:      Effort: Pulmonary effort is normal. No respiratory distress. Neurological:      Mental Status: He is alert and oriented to person, place, and time. Gait: Gait normal.   Psychiatric:         Mood and Affect: Mood normal.         Behavior: Behavior normal.         Thought Content:  Thought content normal.         Judgment: Judgment normal.          Matthew Mancini,   Family Medicine  10/13/2022

## 2022-10-13 ENCOUNTER — OFFICE VISIT (OUTPATIENT)
Dept: FAMILY MEDICINE CLINIC | Age: 47
End: 2022-10-13
Payer: COMMERCIAL

## 2022-10-13 VITALS
OXYGEN SATURATION: 96 % | SYSTOLIC BLOOD PRESSURE: 119 MMHG | TEMPERATURE: 97 F | DIASTOLIC BLOOD PRESSURE: 69 MMHG | HEART RATE: 82 BPM | RESPIRATION RATE: 16 BRPM | HEIGHT: 71 IN | BODY MASS INDEX: 31.22 KG/M2 | WEIGHT: 223 LBS

## 2022-10-13 DIAGNOSIS — I10 HTN, GOAL BELOW 130/80: ICD-10-CM

## 2022-10-13 DIAGNOSIS — E78.5 DYSLIPIDEMIA, GOAL LDL BELOW 70: ICD-10-CM

## 2022-10-13 DIAGNOSIS — Z79.4 INSULIN DEPENDENT TYPE 2 DIABETES MELLITUS (HCC): Primary | ICD-10-CM

## 2022-10-13 DIAGNOSIS — Z63.79 STRESS DUE TO ILLNESS OF FAMILY MEMBER: ICD-10-CM

## 2022-10-13 DIAGNOSIS — J45.40 MODERATE PERSISTENT ASTHMA WITHOUT COMPLICATION: ICD-10-CM

## 2022-10-13 DIAGNOSIS — L50.9 HIVES: ICD-10-CM

## 2022-10-13 DIAGNOSIS — F17.200 TOBACCO USE DISORDER: ICD-10-CM

## 2022-10-13 DIAGNOSIS — F33.1 MODERATE EPISODE OF RECURRENT MAJOR DEPRESSIVE DISORDER (HCC): ICD-10-CM

## 2022-10-13 DIAGNOSIS — E11.9 INSULIN DEPENDENT TYPE 2 DIABETES MELLITUS (HCC): Primary | ICD-10-CM

## 2022-10-13 DIAGNOSIS — Z59.89 UNDERINSURED: ICD-10-CM

## 2022-10-13 PROCEDURE — 3046F HEMOGLOBIN A1C LEVEL >9.0%: CPT | Performed by: STUDENT IN AN ORGANIZED HEALTH CARE EDUCATION/TRAINING PROGRAM

## 2022-10-13 PROCEDURE — 3074F SYST BP LT 130 MM HG: CPT | Performed by: STUDENT IN AN ORGANIZED HEALTH CARE EDUCATION/TRAINING PROGRAM

## 2022-10-13 PROCEDURE — 99214 OFFICE O/P EST MOD 30 MIN: CPT | Performed by: STUDENT IN AN ORGANIZED HEALTH CARE EDUCATION/TRAINING PROGRAM

## 2022-10-13 PROCEDURE — 3078F DIAST BP <80 MM HG: CPT | Performed by: STUDENT IN AN ORGANIZED HEALTH CARE EDUCATION/TRAINING PROGRAM

## 2022-10-13 RX ORDER — METFORMIN HYDROCHLORIDE 1000 MG/1
1000 TABLET ORAL 2 TIMES DAILY WITH MEALS
Qty: 180 TABLET | Refills: 3 | Status: SHIPPED | OUTPATIENT
Start: 2022-10-13

## 2022-10-13 SDOH — ECONOMIC STABILITY - INCOME SECURITY: OTHER PROBLEMS RELATED TO HOUSING AND ECONOMIC CIRCUMSTANCES: Z59.89

## 2022-10-13 NOTE — PROGRESS NOTES
Marlee Schneider is a 48 y.o. male (: 1975) presenting to address:    Chief Complaint   Patient presents with    Medication Evaluation    Results       Vitals:    10/13/22 1035   BP: 119/69   Pulse: 82   Resp: 16   Temp: 97 °F (36.1 °C)   TempSrc: Temporal   SpO2: 96%   Weight: 223 lb (101.2 kg)   Height: 5' 11\" (1.803 m)   PainSc:   0 - No pain       Hearing/Vision:   No results found. Learning Assessment:     Learning Assessment 2022   PRIMARY LEARNER Patient   HIGHEST LEVEL OF EDUCATION - PRIMARY LEARNER  SOME COLLEGE   BARRIERS PRIMARY LEARNER NONE   CO-LEARNER CAREGIVER No   PRIMARY LANGUAGE ENGLISH   LEARNER PREFERENCE PRIMARY DEMONSTRATION   ANSWERED BY self   RELATIONSHIP SELF     Depression Screening:     3 most recent PHQ Screens 10/13/2022   Little interest or pleasure in doing things Not at all   Feeling down, depressed, irritable, or hopeless Not at all   Total Score PHQ 2 0   Trouble falling or staying asleep, or sleeping too much -   Feeling tired or having little energy -   Poor appetite, weight loss, or overeating -   Feeling bad about yourself - or that you are a failure or have let yourself or your family down -   Trouble concentrating on things such as school, work, reading, or watching TV -   Moving or speaking so slowly that other people could have noticed; or the opposite being so fidgety that others notice -   Thoughts of being better off dead, or hurting yourself in some way -   PHQ 9 Score -   How difficult have these problems made it for you to do your work, take care of your home and get along with others -     Fall Risk Assessment:     Fall Risk Assessment, last 12 mths 2022   Able to walk? Yes   Fall in past 12 months? 0   Do you feel unsteady? 0   Are you worried about falling 0     Abuse Screening:     Abuse Screening Questionnaire 2022   Do you ever feel afraid of your partner?  N   Are you in a relationship with someone who physically or mentally threatens you? N   Is it safe for you to go home? Y     ADL Assessment:   No flowsheet data found. Coordination of Care Questionaire:   1. \"Have you been to the ER, urgent care clinic since your last visit? Hospitalized since your last visit? \" No    2. \"Have you seen or consulted any other health care providers outside of the [address] [place] [address] since your last visit? \" No     3. For patients aged 39-70: Has the patient had a colonoscopy / FIT/ Cologuard? Yes - no Care Gap present      If the patient is female:    4. For patients aged 41-77: Has the patient had a mammogram within the past 2 years? NA - based on age or sex  See top three    5. For patients aged 21-65: Has the patient had a pap smear? NA - based on age or sex    Advanced Directive:   1. Do you have an Advanced Directive? NO    2. Would you like information on Advanced Directives?  NO

## 2022-10-13 NOTE — PATIENT INSTRUCTIONS
Insulin adjustment:   If the morning fasting blood glucose is above target (>130), an increase of 3 units in the basal insulin dose should be made approximately every three days. In this way, the basal insulin dose can be titrated over a period of several weeks or months. As you approach the glycemic target (<130 fasting morning glucose), it may be helpful to check a mid-sleep blood glucose to rule out hypoglycemia (this can be done if you spontaneously wake overnight). If having hypoglycemia (values <80), hold at that basal insulin level and we can review.

## 2022-10-20 DIAGNOSIS — E11.9 INSULIN DEPENDENT TYPE 2 DIABETES MELLITUS (HCC): ICD-10-CM

## 2022-10-20 DIAGNOSIS — Z79.4 INSULIN DEPENDENT TYPE 2 DIABETES MELLITUS (HCC): ICD-10-CM

## 2022-10-20 NOTE — TELEPHONE ENCOUNTER
Last seen: 10/13/2022    Last filled: 09/12/2022 qty. 3pens (10 units) w/ 0 refill    **Pt states that you were supposed to adjust the dose for the insulin. I only saw the dose adjustment for Metformin. Please advise.      Future Appointments   Date Time Provider Kylee Finney   12/16/2022 10:30 AM Aleida Bedoya DO [facility] BS AMB

## 2022-10-21 RX ORDER — INSULIN GLARGINE 100 [IU]/ML
15 INJECTION, SOLUTION SUBCUTANEOUS
Qty: 3 ADJUSTABLE DOSE PRE-FILLED PEN SYRINGE | Refills: 5 | Status: SHIPPED | OUTPATIENT
Start: 2022-10-21

## 2022-11-03 ENCOUNTER — TELEPHONE (OUTPATIENT)
Dept: FAMILY MEDICINE CLINIC | Age: 47
End: 2022-11-03

## 2022-11-03 NOTE — TELEPHONE ENCOUNTER
PT called office to schedule appt. Dr Umair Conte didn't have any appt times till Nov 17. Pt stated he would wait till his appt in Dec with the Dr. Usman Milner was advised to go to Urgent Care or Er if condition worsens. Pt expressed understanding.   Future Appointments   Date Time Provider Kylee Finney   12/16/2022 10:30 AM Aleida Bedoya DO [facility] BS AMB

## 2022-12-14 NOTE — PROGRESS NOTES
Osmel Boo (: 1975) is a 50 y.o. male, ESTABLISHED patient, here for FOLLOW UP:    ICD-10-CM ICD-9-CM   1. Insulin dependent type 2 diabetes mellitus (HCC)  E11.9 250.00    Z79.4 V58.67   2. Moderate persistent asthma without complication  U99.70 844.54   3. Mucopurulent chronic bronchitis (HCC)  J41.1 491.1   4. Leg cramp  R25.2 729.82   5. Blue color skin  R23.0 782.5   6. Tobacco use disorder  F17.200 305.1     Assessment   Plan     #B/L feet numbness/tingling, color changes ( red to blue to white ) some exertional component   Given smoking hx and significant family hx of CAD, plan to eval B/L LE arterial ultrasound    #Raynaud's   B/L Hand color changes with triggers such as cold  Reviewed sx, treatment options    #Stress due to family illness  Daughter in rehab     #HTN - Goal BP <130/80; Elevated today  BP cuff at home? No   Given on average well controlled without treatment, will monitor  Previously on Lisinopril-HCTZ 20-25mg without issue  Future consideration: Would add CCB if needed to also help Raynaud     BP Readings from Last 3 Encounters:   22 (!) 155/86   10/13/22 119/69   22 104/69     #HLD - Goal LDL < 70  Tolerating medications without notable AE, will continue regimen unchanged    Lab Results   Component Value Date/Time    LDL, calculated 122.8 (H) 2022 02:13 PM     The 10-year ASCVD risk score (Brett CARRINGTON, et al., 2019) is: 18.1%    Values used to calculate the score:      Age: 48 years      Sex: Male      Is Non- : No      Diabetic: Yes      Tobacco smoker: Yes      Systolic Blood Pressure: 686 mmHg      Is BP treated: No      HDL Cholesterol: 39 MG/DL      Total Cholesterol: 234 MG/DL    Key CAD CHF Meds               simvastatin (ZOCOR) 40 mg tablet (Taking) TAKE 1 TABLET BY MOUTH AT BEDTIME FOR CHOLESTEROL          #IDDM2 - Goal A1C <7  Glucometer? Has at home; Average 200-250; Goal to try for CGM if affordable   Hypoglycemia? Denies  Restarted on Long acting insulin and Metformin without notable AE. Reviewed safe titration, 3unit/3day until goal, instructions provided. Unable to afford GLP 1 agonists at present, would retry after Jan 2023 when [facility] renews. Based on current coverage, next med trial will be Jardiance. Encouraged to have annual foot exam to r/o peripheral neuropathy. Encouraged to update dilated eye exam annually to r/o retinopathy. .  Key Antihyperglycemic Medications               insulin glargine (LANTUS,BASAGLAR) 100 unit/mL (3 mL) inpn (Taking) 55 Units by SubCUTAneous route nightly. Indications: type 2 diabetes mellitus    metFORMIN (GLUCOPHAGE) 1,000 mg tablet (Taking) Take 1 Tablet by mouth two (2) times daily (with meals). Indications: type 2 diabetes mellitus            #? Mild persistent asthma - uncontrolled vs COPD  With #Tobacco use disorder - contemplative  Reviewed that the best rates of smoking cessation success are found with a combination of behavioral changes, medications, and nicotine replacement therapy. Encouraged to follow up for assistance if interested. In review of medications, reports using rescue inhaler three times daily. Reviewed optimal use of preventative and rescue inhalers. Reviewed to follow up if use of rescue inhaler (albuterol) continues to be twice weekly or more. Was unable to afford Pulmicort, Advair. Plan to see if Spiriva covered as alternative maintenance. Key COPD Medications               fluticasone-Salmeterol (ADVAIR HFA) 45-21 mcg/actuation inhaler (Taking) Take 2 Puffs by inhalation two (2) times a day. Indications: maintenance breathing treatment    albuterol (PROVENTIL HFA, VENTOLIN HFA, PROAIR HFA) 90 mcg/actuation inhaler (Taking) Take 2 Puffs by inhalation every four to six (4-6) hours as needed for Wheezing or Shortness of Breath.           #Hx of MARY severe in need of CPAP  Reviewed signs/sx indicating evaluation for sleep study needed and agreeable to referral, ordered 22. Discussed treatment options including weight loss efforts up to use of CPAP if indicated. #MDD with #KRYSTIAN  Current regimen and adjustments: Reviewed goal to avoid changing multiple psychiatric medications at a time to determine which are helping/having AE. Started on Wellbutrin and tolerating well. Counseled on nonpharmacologic interventions that could improve outcomes as well including exercise, therapy/counseling, and self care. Encouraged to establish care with Psychiatry services for counseling. HM:  Lung CA screening indicated at 49y/o - Reviewed risk/benefit of annual screening for lung cancer with low-dose computed tomography in adults aged 48 to 80 years who have a 20 pack-year smoking history and currently smoke or have quit within the past 15 years. Patient agreeable to screening            Orders Placed This Encounter    AMB POC HEMOGLOBIN A1C    DUPLEX LOW EXT ARTERIES WITH ROSALINDA     Standing Status:   Future     Standing Expiration Date:   2023     Scheduling Instructions:      [facility]    tiotropium-olodateroL (STIOLTO RESPIMAT) 2.5-2.5 mcg/actuation inhaler     Sig: Take 2 Puffs by inhalation daily. Indications: bronchospasm prevention with COPD     Dispense:  3 Each     Refill:  5    insulin glargine (LANTUS,BASAGLAR) 100 unit/mL (3 mL) inpn     Si Units by SubCUTAneous route nightly. Indications: type 2 diabetes mellitus     Dispense:  4 Adjustable Dose Pre-filled Pen Syringe     Refill:  5     Return in about 3 months (around 3/16/2023) for A1C check, 45 min follow up. Subjective   Last PCP visit: 10/13/2022  Since last visit:   Any changes in health, procedures, hospital visits, or specialist visits? Denies  Tolerating medications without adverse reactions? Reports good compliance with Rx without notable adverse effects.        Current Outpatient Medications   Medication Instructions    albuterol (PROVENTIL HFA, VENTOLIN HFA, PROAIR HFA) 90 mcg/actuation inhaler 2 Puffs, Inhalation, EVERY 4-6 HOURS PRN    buPROPion XL (WELLBUTRIN XL) 150 mg, Oral, DAILY, in the morning    ibuprofen (MOTRIN) 800 mg, Oral, EVERY 6 HOURS AS NEEDED    insulin glargine (LANTUS,BASAGLAR) 55 Units, SubCUTAneous, EVERY BEDTIME    metFORMIN (GLUCOPHAGE) 1,000 mg, Oral, 2 TIMES DAILY WITH MEALS    simvastatin (ZOCOR) 40 mg tablet TAKE 1 TABLET BY MOUTH AT BEDTIME FOR CHOLESTEROL    tiotropium-olodateroL (STIOLTO RESPIMAT) 2.5-2.5 mcg/actuation inhaler 2 Puffs, Inhalation, DAILY      Objective   Visit Vitals  BP (!) 155/86 (BP 1 Location: Right arm, BP Patient Position: Sitting)   Pulse 91   Temp 97.8 °F (36.6 °C) (Temporal)   Resp 16   Ht 5' 11\" (1.803 m)   Wt 235 lb (106.6 kg)   SpO2 93%   BMI 32.78 kg/m²     Physical Exam  Vitals and nursing note reviewed. Constitutional:       General: He is not in acute distress. Interventions: Face mask in place. Cardiovascular:      Rate and Rhythm: Normal rate. Pulses: Normal pulses. Pulmonary:      Effort: Pulmonary effort is normal. No respiratory distress. Neurological:      Mental Status: He is alert and oriented to person, place, and time. Gait: Gait normal.   Psychiatric:         Mood and Affect: Mood normal.         Behavior: Behavior normal.         Thought Content:  Thought content normal.         Judgment: Judgment normal.          Dmitriy Stark,   Family Medicine  12/16/2022

## 2022-12-16 ENCOUNTER — OFFICE VISIT (OUTPATIENT)
Dept: FAMILY MEDICINE CLINIC | Age: 47
End: 2022-12-16
Payer: COMMERCIAL

## 2022-12-16 VITALS
HEIGHT: 71 IN | HEART RATE: 91 BPM | DIASTOLIC BLOOD PRESSURE: 86 MMHG | SYSTOLIC BLOOD PRESSURE: 155 MMHG | WEIGHT: 235 LBS | RESPIRATION RATE: 16 BRPM | TEMPERATURE: 97.8 F | BODY MASS INDEX: 32.9 KG/M2 | OXYGEN SATURATION: 93 %

## 2022-12-16 DIAGNOSIS — E11.9 INSULIN DEPENDENT TYPE 2 DIABETES MELLITUS (HCC): Primary | ICD-10-CM

## 2022-12-16 DIAGNOSIS — Z79.4 INSULIN DEPENDENT TYPE 2 DIABETES MELLITUS (HCC): Primary | ICD-10-CM

## 2022-12-16 DIAGNOSIS — F17.200 TOBACCO USE DISORDER: ICD-10-CM

## 2022-12-16 DIAGNOSIS — J45.40 MODERATE PERSISTENT ASTHMA WITHOUT COMPLICATION: ICD-10-CM

## 2022-12-16 DIAGNOSIS — R25.2 LEG CRAMP: ICD-10-CM

## 2022-12-16 DIAGNOSIS — R23.0 BLUE COLOR SKIN: ICD-10-CM

## 2022-12-16 DIAGNOSIS — J41.1 MUCOPURULENT CHRONIC BRONCHITIS (HCC): ICD-10-CM

## 2022-12-16 RX ORDER — INSULIN GLARGINE 100 [IU]/ML
55 INJECTION, SOLUTION SUBCUTANEOUS
Qty: 4 ADJUSTABLE DOSE PRE-FILLED PEN SYRINGE | Refills: 5 | Status: SHIPPED | OUTPATIENT
Start: 2022-12-16

## 2022-12-16 NOTE — PROGRESS NOTES
Brittnee Richard is a 50 y.o. male (: 1975) presenting to address:    Chief Complaint   Patient presents with    Medication Evaluation       Vitals:    22 1028   BP: (!) 150/82   Pulse: 91   Resp: 16   Temp: 97.8 °F (36.6 °C)   TempSrc: Temporal   SpO2: 93%   Weight: 235 lb (106.6 kg)   Height: 5' 11\" (1.803 m)   PainSc:   0 - No pain       Hearing/Vision:   No results found. Learning Assessment:     Learning Assessment 2022   PRIMARY LEARNER Patient   HIGHEST LEVEL OF EDUCATION - PRIMARY LEARNER  SOME COLLEGE   BARRIERS PRIMARY LEARNER NONE   CO-LEARNER CAREGIVER No   PRIMARY LANGUAGE ENGLISH   LEARNER PREFERENCE PRIMARY DEMONSTRATION   ANSWERED BY self   RELATIONSHIP SELF     Depression Screening:     3 most recent PHQ Screens 2022   Little interest or pleasure in doing things Not at all   Feeling down, depressed, irritable, or hopeless Not at all   Total Score PHQ 2 0   Trouble falling or staying asleep, or sleeping too much -   Feeling tired or having little energy -   Poor appetite, weight loss, or overeating -   Feeling bad about yourself - or that you are a failure or have let yourself or your family down -   Trouble concentrating on things such as school, work, reading, or watching TV -   Moving or speaking so slowly that other people could have noticed; or the opposite being so fidgety that others notice -   Thoughts of being better off dead, or hurting yourself in some way -   PHQ 9 Score -   How difficult have these problems made it for you to do your work, take care of your home and get along with others -     Fall Risk Assessment:     Fall Risk Assessment, last 12 mths 2022   Able to walk? Yes   Fall in past 12 months? 0   Do you feel unsteady? 0   Are you worried about falling 0     Abuse Screening:     Abuse Screening Questionnaire 2022   Do you ever feel afraid of your partner? N   Are you in a relationship with someone who physically or mentally threatens you?  Gita Vuong Is it safe for you to go home? Y     ADL Assessment:   No flowsheet data found. Coordination of Care Questionaire:   1. \"Have you been to the ER, urgent care clinic since your last visit? Hospitalized since your last visit? \" No    2. \"Have you seen or consulted any other health care providers outside of the [address] [place] [address] since your last visit? \" No     3. For patients aged 39-70: Has the patient had a colonoscopy / FIT/ Cologuard? Yes - no Care Gap present      If the patient is female:    4. For patients aged 41-77: Has the patient had a mammogram within the past 2 years? NA - based on age or sex  See top three    5. For patients aged 21-65: Has the patient had a pap smear? NA - based on age or sex    Advanced Directive:   1. Do you have an Advanced Directive? NO    2. Would you like information on Advanced Directives?  NO

## 2022-12-20 ENCOUNTER — TELEPHONE (OUTPATIENT)
Dept: FAMILY MEDICINE CLINIC | Age: 47
End: 2022-12-20

## 2023-03-13 ENCOUNTER — HOSPITAL ENCOUNTER (OUTPATIENT)
Facility: HOSPITAL | Age: 48
Setting detail: SPECIMEN
Discharge: HOME | End: 2023-03-16
Payer: COMMERCIAL

## 2023-03-13 ENCOUNTER — OFFICE VISIT (OUTPATIENT)
Dept: FAMILY MEDICINE CLINIC | Facility: CLINIC | Age: 48
End: 2023-03-13
Payer: COMMERCIAL

## 2023-03-13 VITALS
OXYGEN SATURATION: 96 % | TEMPERATURE: 98.1 F | RESPIRATION RATE: 17 BRPM | HEART RATE: 81 BPM | BODY MASS INDEX: 34.16 KG/M2 | WEIGHT: 244 LBS | DIASTOLIC BLOOD PRESSURE: 84 MMHG | HEIGHT: 71 IN | SYSTOLIC BLOOD PRESSURE: 143 MMHG

## 2023-03-13 DIAGNOSIS — I10 HTN, GOAL BELOW 130/80: ICD-10-CM

## 2023-03-13 DIAGNOSIS — Z79.4 LONG TERM (CURRENT) USE OF INSULIN (HCC): ICD-10-CM

## 2023-03-13 DIAGNOSIS — E66.01 SEVERE OBESITY (HCC): ICD-10-CM

## 2023-03-13 DIAGNOSIS — E11.9 TYPE 2 DIABETES MELLITUS WITHOUT COMPLICATION, WITH LONG-TERM CURRENT USE OF INSULIN (HCC): ICD-10-CM

## 2023-03-13 DIAGNOSIS — E78.5 DYSLIPIDEMIA, GOAL LDL BELOW 70: ICD-10-CM

## 2023-03-13 DIAGNOSIS — I87.2 VENOUS INSUFFICIENCY OF BOTH LOWER EXTREMITIES: ICD-10-CM

## 2023-03-13 DIAGNOSIS — E11.9 TYPE 2 DIABETES MELLITUS WITHOUT COMPLICATION, WITH LONG-TERM CURRENT USE OF INSULIN (HCC): Primary | ICD-10-CM

## 2023-03-13 DIAGNOSIS — F33.1 MAJOR DEPRESSIVE DISORDER, RECURRENT, MODERATE (HCC): ICD-10-CM

## 2023-03-13 DIAGNOSIS — J41.1 MUCOPURULENT CHRONIC BRONCHITIS (HCC): ICD-10-CM

## 2023-03-13 DIAGNOSIS — F17.200 TOBACCO USE DISORDER: ICD-10-CM

## 2023-03-13 DIAGNOSIS — Z79.4 TYPE 2 DIABETES MELLITUS WITHOUT COMPLICATION, WITH LONG-TERM CURRENT USE OF INSULIN (HCC): ICD-10-CM

## 2023-03-13 DIAGNOSIS — Z79.4 TYPE 2 DIABETES MELLITUS WITHOUT COMPLICATION, WITH LONG-TERM CURRENT USE OF INSULIN (HCC): Primary | ICD-10-CM

## 2023-03-13 DIAGNOSIS — Z59.9 FINANCIAL DIFFICULTIES: ICD-10-CM

## 2023-03-13 LAB
CREAT UR-MCNC: 146 MG/DL (ref 30–125)
HBA1C MFR BLD: 10.2 %
MICROALBUMIN UR-MCNC: 260 MG/DL (ref 0–3)
MICROALBUMIN/CREAT UR-RTO: 1781 MG/G (ref 0–30)

## 2023-03-13 PROCEDURE — 4004F PT TOBACCO SCREEN RCVD TLK: CPT | Performed by: STUDENT IN AN ORGANIZED HEALTH CARE EDUCATION/TRAINING PROGRAM

## 2023-03-13 PROCEDURE — 95250 CONT GLUC MNTR PHYS/QHP EQP: CPT | Performed by: STUDENT IN AN ORGANIZED HEALTH CARE EDUCATION/TRAINING PROGRAM

## 2023-03-13 PROCEDURE — 82570 ASSAY OF URINE CREATININE: CPT

## 2023-03-13 PROCEDURE — 99214 OFFICE O/P EST MOD 30 MIN: CPT | Performed by: STUDENT IN AN ORGANIZED HEALTH CARE EDUCATION/TRAINING PROGRAM

## 2023-03-13 PROCEDURE — 3046F HEMOGLOBIN A1C LEVEL >9.0%: CPT | Performed by: STUDENT IN AN ORGANIZED HEALTH CARE EDUCATION/TRAINING PROGRAM

## 2023-03-13 PROCEDURE — G8417 CALC BMI ABV UP PARAM F/U: HCPCS | Performed by: STUDENT IN AN ORGANIZED HEALTH CARE EDUCATION/TRAINING PROGRAM

## 2023-03-13 PROCEDURE — 3023F SPIROM DOC REV: CPT | Performed by: STUDENT IN AN ORGANIZED HEALTH CARE EDUCATION/TRAINING PROGRAM

## 2023-03-13 PROCEDURE — 3077F SYST BP >= 140 MM HG: CPT | Performed by: STUDENT IN AN ORGANIZED HEALTH CARE EDUCATION/TRAINING PROGRAM

## 2023-03-13 PROCEDURE — 2022F DILAT RTA XM EVC RTNOPTHY: CPT | Performed by: STUDENT IN AN ORGANIZED HEALTH CARE EDUCATION/TRAINING PROGRAM

## 2023-03-13 PROCEDURE — G8427 DOCREV CUR MEDS BY ELIG CLIN: HCPCS | Performed by: STUDENT IN AN ORGANIZED HEALTH CARE EDUCATION/TRAINING PROGRAM

## 2023-03-13 PROCEDURE — 3079F DIAST BP 80-89 MM HG: CPT | Performed by: STUDENT IN AN ORGANIZED HEALTH CARE EDUCATION/TRAINING PROGRAM

## 2023-03-13 PROCEDURE — 83036 HEMOGLOBIN GLYCOSYLATED A1C: CPT | Performed by: STUDENT IN AN ORGANIZED HEALTH CARE EDUCATION/TRAINING PROGRAM

## 2023-03-13 PROCEDURE — G8484 FLU IMMUNIZE NO ADMIN: HCPCS | Performed by: STUDENT IN AN ORGANIZED HEALTH CARE EDUCATION/TRAINING PROGRAM

## 2023-03-13 RX ORDER — PIOGLITAZONEHYDROCHLORIDE 30 MG/1
30 TABLET ORAL DAILY
Qty: 90 TABLET | Refills: 1 | Status: SHIPPED | OUTPATIENT
Start: 2023-03-13

## 2023-03-13 RX ORDER — LOSARTAN POTASSIUM 50 MG/1
50 TABLET ORAL DAILY
Qty: 90 TABLET | Refills: 1 | Status: SHIPPED | OUTPATIENT
Start: 2023-03-13

## 2023-03-13 SDOH — ECONOMIC STABILITY: FOOD INSECURITY: WITHIN THE PAST 12 MONTHS, YOU WORRIED THAT YOUR FOOD WOULD RUN OUT BEFORE YOU GOT MONEY TO BUY MORE.: NEVER TRUE

## 2023-03-13 SDOH — ECONOMIC STABILITY: INCOME INSECURITY: HOW HARD IS IT FOR YOU TO PAY FOR THE VERY BASICS LIKE FOOD, HOUSING, MEDICAL CARE, AND HEATING?: NOT HARD AT ALL

## 2023-03-13 SDOH — ECONOMIC STABILITY: FOOD INSECURITY: WITHIN THE PAST 12 MONTHS, THE FOOD YOU BOUGHT JUST DIDN'T LAST AND YOU DIDN'T HAVE MONEY TO GET MORE.: NEVER TRUE

## 2023-03-13 SDOH — ECONOMIC STABILITY - INCOME SECURITY: PROBLEM RELATED TO HOUSING AND ECONOMIC CIRCUMSTANCES, UNSPECIFIED: Z59.9

## 2023-03-13 SDOH — ECONOMIC STABILITY: HOUSING INSECURITY
IN THE LAST 12 MONTHS, WAS THERE A TIME WHEN YOU DID NOT HAVE A STEADY PLACE TO SLEEP OR SLEPT IN A SHELTER (INCLUDING NOW)?: NO

## 2023-03-13 ASSESSMENT — PATIENT HEALTH QUESTIONNAIRE - PHQ9
4. FEELING TIRED OR HAVING LITTLE ENERGY: 0
SUM OF ALL RESPONSES TO PHQ QUESTIONS 1-9: 0
SUM OF ALL RESPONSES TO PHQ QUESTIONS 1-9: 0
1. LITTLE INTEREST OR PLEASURE IN DOING THINGS: 0
7. TROUBLE CONCENTRATING ON THINGS, SUCH AS READING THE NEWSPAPER OR WATCHING TELEVISION: 0
SUM OF ALL RESPONSES TO PHQ QUESTIONS 1-9: 0
SUM OF ALL RESPONSES TO PHQ9 QUESTIONS 1 & 2: 0
6. FEELING BAD ABOUT YOURSELF - OR THAT YOU ARE A FAILURE OR HAVE LET YOURSELF OR YOUR FAMILY DOWN: 0
2. FEELING DOWN, DEPRESSED OR HOPELESS: 0
SUM OF ALL RESPONSES TO PHQ QUESTIONS 1-9: 0
5. POOR APPETITE OR OVEREATING: 0
3. TROUBLE FALLING OR STAYING ASLEEP: 0
SUM OF ALL RESPONSES TO PHQ QUESTIONS 1-9: 0
SUM OF ALL RESPONSES TO PHQ9 QUESTIONS 1 & 2: 0
SUM OF ALL RESPONSES TO PHQ QUESTIONS 1-9: 0
2. FEELING DOWN, DEPRESSED OR HOPELESS: 0
1. LITTLE INTEREST OR PLEASURE IN DOING THINGS: 0
8. MOVING OR SPEAKING SO SLOWLY THAT OTHER PEOPLE COULD HAVE NOTICED. OR THE OPPOSITE, BEING SO FIGETY OR RESTLESS THAT YOU HAVE BEEN MOVING AROUND A LOT MORE THAN USUAL: 0
9. THOUGHTS THAT YOU WOULD BE BETTER OFF DEAD, OR OF HURTING YOURSELF: 0

## 2023-03-13 NOTE — PATIENT INSTRUCTIONS
What is chronic venous disease? Chronic venous disease is a common disorder that affects the veins of the legs. These veins carry blood from the legs to the heart. If the valves within the veins fail to work properly, there is a blockage to normal flow, or the calf muscles cannot pump properly, blood can flow backwards in the veins and pool in the legs. The pooled blood can increase pressure in the veins. This can cause problems that range in severity from mild (such as a feeling of leg heaviness, aching, or dilated or unsightly veins) to severe (such as swelling of the leg, ankles or feet, skin color changes, skin rash on the leg, recurrent skin infections, and chronic ulcers). What can I do to treat it? Treatment of chronic venous disease is focused on reducing symptoms, such as swelling, treating skin problems, preventing and treating ulcers, and improving blood flow from the legs. Leg elevation -- Simply elevating the legs above heart level for 30 minutes three or four times per day can reduce swelling and improve blood flow in the veins. Improving blood flow can speed healing of venous ulcers. However, it may not be practical for some people to elevate their legs several times per day. To be effective, it is important to elevate the legs above the level of the heart; simply putting your legs up on a footstool does little to improve drainage of blood from the legs. Leg elevation alone may be the only treatment needed for people with mild chronic venous disease, but additional treatments are usually needed in more severe cases. Exercises -- Foot and ankle exercises are often recommended to reduce symptoms. Pointing the feet down and up (movement from the ankle) several times throughout the day can help move blood from the legs back to the heart, as can repeatedly lifting the heels off the floor to stand on the toes, repeating several sets of this exercise daily.  This may be especially helpful for people who sit or stand for long periods of time. Walking is a good exercise to activate the calf muscle pump. Compression stockings -- Most experts consider compression therapy to be an essential treatment for chronic venous disease. Compression stockings are recommended for most people with chronic venous disease. Compression stockings gently compress the legs and may improve blood flow in the veins by preventing backward flow of blood. Effective compression stockings apply the greatest amount of pressure at the ankle and gradually decrease the pressure up the leg. These stockings are available with varying degrees of compression. ? Stockings with small amounts of compression can be purchased at pharmacies and surgical supply stores without a prescription. ? People with moderate-to-severe disease, those who stand for long periods of time, and those with venous ulcers usually require prescription stockings. A healthcare provider may take measurements for stockings, or may write a prescription for stockings that can be filled at a surgical supply or specialty store where trained staff take the necessary measurements. ?Stockings are available in several styles, including knee-high, thigh-high, and pantyhose with open or closed toes. Knee-high stockings are sufficient for most people. Some people experience skin irritation or pain, especially with initial use of compression stockings, which can be related to improper fit or highly inflamed skin.

## 2023-03-13 NOTE — PROGRESS NOTES
Marlin Das is a 50 y.o. male (: 1975) presenting to address:    Chief Complaint   Patient presents with    Follow-up       Vitals:    23 1058   BP: (!) 143/84   Pulse: 81   Resp: 17   Temp: 98.1 °F (36.7 °C)   SpO2: 96%       Coordination of Care Questionaire:   1. \"Have you been to the ER, urgent care clinic since your last visit? Hospitalized since your last visit? \" No    2. \"Have you seen or consulted any other health care providers outside of the [address] [place] [address] since your last visit? \" No     3. For patients aged 39-70: Has the patient had a colonoscopy / FIT/ Cologuard? No      If the patient is female:    4. For patients aged 41-77: Has the patient had a mammogram within the past 2 years? NA - based on age or sex      11. For patients aged 21-65: Has the patient had a pap smear? NA - based on age or sex    Advanced Directive:   1. Do you have an Advanced Directive? No    2. Would you like information on Advanced Directives?  No

## 2023-03-13 NOTE — PROGRESS NOTES
Shahram Schuler (: 1975) is a 50 y.o. male, ESTABLISHED patient, here for FOLLOW UP:    ICD-10-CM    1. Type 2 diabetes mellitus without complication, with long-term current use of insulin (HCC)  E11.9 AMB POC HEMOGLOBIN A1C    Z79.4 Microalbumin / Creatinine Urine Ratio     pioglitazone (ACTOS) 30 MG tablet     CA CONT GLUC MNTR PHYSICIAN/QHP PROVIDED EQUIPMENT      2. HTN, goal below 130/80  I10 losartan (COZAAR) 50 MG tablet      3. Venous insufficiency of both lower extremities  I87.2       4. Financial difficulties  Z59.9       5. Long term (current) use of insulin (HCC)  Z79.4       6. Tobacco use disorder  F17.200       7. Severe obesity ([facility])  E66.01       8. Mucopurulent chronic bronchitis ([facility] [address]  J41.1       9. Major depressive disorder, recurrent, moderate ([facility])  F33.1       10. Dyslipidemia, goal LDL below 70  E78.5         Assessment   Plan     #B/L veinous insufficiency - chronic  Reviewed increased risk of wounds with above dx and provided handout regarding optimal use of conservative treatments. B/L LE venous U/S 23   Right: Venous valvular reflux > 1000 ms was identified in the right femoral and popliteal deep venous system and > 500 ms in the right great saphenous vein at the saphenofemoral junction. Left: Venous valvular reflux > 1000 ms was identified in the left common femoral and femoral deep venous system and > 500 ms in the left great saphenous vein at the saphenofemoral junction. #Raynaud's   B/L Hand color changes with triggers such as cold  Reviewed sx, treatment options     #Stress due to family illness  Daughter in rehab      #HTN - Goal BP <130/80  BP cuff at home? No   Previously on Lisinopril-HCTZ 20-25mg with lightheadedness, thought 2/2 to Lisinopril. Agreeable to restart new treatment, prefers alternative to Lisinopril; Due to cost concerns, plan to chose medication utilizing $4 Skysheet list for guidance.  Agreeable to Losartan (which if need to change to combination treatment remains on list)   Future consideration: Could add CCB if needed to also help Raynaud      BP Readings from Last 3 Encounters:   03/13/23 (!) 143/84   12/16/22 (!) 155/86   10/13/22 119/69     #HLD - Goal LDL < 70  Tolerating medications without notable AE, will continue regimen unchanged           Lab Results   Component Value Date/Time     LDL, calculated 122.8 (H) 09/12/2022 02:13 PM      The 10-year ASCVD risk score (Aniceto VASQUEZ, et al., 2019) is: 18.1%    Values used to calculate the score:      Age: 48 years      Sex: Male      Is Non- : No      Diabetic: Yes      Tobacco smoker: Yes      Systolic Blood Pressure: 460 mmHg      Is BP treated: No      HDL Cholesterol: 39 MG/DL      Total Cholesterol: 234 MG/DL     Key CAD CHF Meds                    simvastatin (ZOCOR) 40 mg tablet (Taking) TAKE 1 TABLET BY MOUTH AT BEDTIME FOR CHOLESTEROL             #IDDM2 - Goal A1C <7  Glucometer? Has at home; Average 200-250; Was able to start but has had difficulty with use, few have fallen off. #CGM sample placed in office with instruction and review of data available for review on laila including trends. Identified goal to reduce late night eating as highest spike tends to be overnight, early AM.   Hypoglycemia? Denies  Restarted on Long acting insulin and Metformin without notable AE. Reviewed safe titration, 3unit/3day until goal, instructions provided. Unable to afford GLP 1 agonists, SGLT2 inhibitors at present. Given need for improved control, agreeable to add on Pioglitazone, max if needed, then add glipizide then max if needed until able to afford GLP or SGLT2. Encouraged to have annual foot exam to r/o peripheral neuropathy. Encouraged to update dilated eye exam annually to r/o retinopathy. Key Antihyperglycemic Medications            pioglitazone (ACTOS) 30 MG tablet (Taking)    Sig - Route:  Take 1 tablet by mouth daily Indications: diabetes - Oral    insulin glargine (LANTUS SOLOSTAR) 100 UNIT/ML injection pen (Taking)    Class: Historical Med    metFORMIN (GLUCOPHAGE) 1000 MG tablet (Taking)    Class: Historical Med          #? Mild persistent asthma - uncontrolled vs COPD  With #Tobacco use disorder - contemplative  Reviewed that the best rates of smoking cessation success are found with a combination of behavioral changes, medications, and nicotine replacement therapy. Encouraged to follow up for assistance if interested. In review of medications, reports using rescue inhaler three times daily. Reviewed optimal use of preventative and rescue inhalers. Reviewed to follow up if use of rescue inhaler (albuterol) continues to be twice weekly or more. Was unable to afford Pulmicort, Advair, Spiriva. Reports Albuterol completely covered. #Hx of JAYDA severe in need of CPAP  Reviewed signs/sx indicating evaluation for sleep study needed and agreeable to referral, ordered 9/12/22. Discussed treatment options including weight loss efforts up to use of CPAP if indicated. #MDD with #JACOB  Current regimen and adjustments: Reviewed goal to avoid changing multiple psychiatric medications at a time to determine which are helping/having AE. Brief trial of Wellbutrin, tolerated well. Counseled on nonpharmacologic interventions that could improve outcomes as well including exercise, therapy/counseling, and self care. Encouraged to establish care with Psychiatry services for counseling. #Body mass index is 34.03 kg/m². Counseled on diet and exercise methods which patient has tried >6 months without improvement. Positive reinforcement provided. Reviewed co morbidities that would benefit from weight loss.     Wt Readings from Last 3 Encounters:   03/13/23 244 lb (110.7 kg)   12/16/22 235 lb (106.6 kg)   10/13/22 223 lb (101.2 kg)     HM:  Lung CA screening indicated at 51y/o - Reviewed risk/benefit of annual screening for lung cancer with low-dose computed tomography in adults aged 48 to [de-identified] years who have a 20 pack-year smoking history and currently smoke or have quit within the past 15 years. Patient agreeable to screening          Orders Placed This Encounter   Procedures    Microalbumin / Creatinine Urine Ratio     Standing Status:   Future     Number of Occurrences:   1     Standing Expiration Date:   3/13/2024    AMB POC HEMOGLOBIN A1C    CT CONT GLUC MNTR PHYSICIAN/QHP PROVIDED EQUIPMENT     Return in about 4 months (around 7/13/2023) for BP, freestyle review , 45min, A1C. Subjective   See A/P     Current Outpatient Medications   Medication Instructions    albuterol sulfate HFA (PROVENTIL;VENTOLIN;PROAIR) 108 (90 Base) MCG/ACT inhaler 2 puffs, Inhalation    ibuprofen (ADVIL;MOTRIN) 800 mg, Oral, EVERY 6 HOURS PRN    Lantus SoloStar 55 Units, SubCUTAneous    losartan (COZAAR) 50 mg, Oral, DAILY    metFORMIN (GLUCOPHAGE) 1,000 mg, Oral, 2 TIMES DAILY WITH MEALS    pioglitazone (ACTOS) 30 mg, Oral, DAILY    simvastatin (ZOCOR) 40 MG tablet TAKE 1 TABLET BY MOUTH AT BEDTIME FOR CHOLESTEROL      Objective   BP (!) 143/84 (Site: Left Upper Arm, Position: Sitting, Cuff Size: Large Adult)   Pulse 81   Temp 98.1 °F (36.7 °C) (Temporal)   Resp 17   Ht 5' 11\" (1.803 m)   Wt 244 lb (110.7 kg)   SpO2 96%   BMI 34.03 kg/m²   Physical Exam  Vitals and nursing note reviewed. Constitutional:       General: He is not in acute distress. Cardiovascular:      Rate and Rhythm: Normal rate. Pulmonary:      Effort: Pulmonary effort is normal. No respiratory distress. Neurological:      Mental Status: He is alert and oriented to person, place, and time. Gait: Gait normal.   Psychiatric:         Mood and Affect: Mood normal.         Behavior: Behavior normal.         Thought Content:  Thought content normal.         Judgment: Judgment normal.          Naomy Sheth DO  Family Medicine  03/13/2023

## 2023-05-18 DIAGNOSIS — J41.1 MUCOPURULENT CHRONIC BRONCHITIS (HCC): Primary | ICD-10-CM

## 2023-05-18 RX ORDER — ALBUTEROL SULFATE 90 UG/1
AEROSOL, METERED RESPIRATORY (INHALATION)
Qty: 9 G | Refills: 5 | Status: SHIPPED | OUTPATIENT
Start: 2023-05-18

## 2023-05-18 NOTE — TELEPHONE ENCOUNTER
Last seen: 03/13/23    Last filled: 09/12/23        albuterol (PROVENTIL HFA, VENTOLIN HFA, PROAIR HFA) 90 mcg/actuation inhaler 18 g      Future Appointments   Date Time Provider Wilder Purdy   7/14/2023 10:30 AM DO NUSRAT Vitale BS AM[facility]

## 2023-07-13 ENCOUNTER — OFFICE VISIT (OUTPATIENT)
Dept: FAMILY MEDICINE CLINIC | Facility: CLINIC | Age: 48
End: 2023-07-13
Payer: COMMERCIAL

## 2023-07-13 ENCOUNTER — HOSPITAL ENCOUNTER (OUTPATIENT)
Facility: HOSPITAL | Age: 48
Setting detail: SPECIMEN
Discharge: HOME | End: 2023-07-13
Payer: COMMERCIAL

## 2023-07-13 VITALS
SYSTOLIC BLOOD PRESSURE: 131 MMHG | TEMPERATURE: 98.1 F | HEIGHT: 71 IN | DIASTOLIC BLOOD PRESSURE: 84 MMHG | BODY MASS INDEX: 34.72 KG/M2 | RESPIRATION RATE: 16 BRPM | HEART RATE: 83 BPM | OXYGEN SATURATION: 94 % | WEIGHT: 248 LBS

## 2023-07-13 DIAGNOSIS — N18.31 STAGE 3A CHRONIC KIDNEY DISEASE (HCC): ICD-10-CM

## 2023-07-13 DIAGNOSIS — E55.9 VITAMIN D DEFICIENCY: Primary | ICD-10-CM

## 2023-07-13 DIAGNOSIS — N18.31 TYPE 2 DIABETES MELLITUS WITH STAGE 3A CHRONIC KIDNEY DISEASE, WITH LONG-TERM CURRENT USE OF INSULIN (HCC): ICD-10-CM

## 2023-07-13 DIAGNOSIS — G47.33 OSA (OBSTRUCTIVE SLEEP APNEA): ICD-10-CM

## 2023-07-13 DIAGNOSIS — Z97.8 USES SELF-APPLIED CONTINUOUS GLUCOSE MONITORING DEVICE: ICD-10-CM

## 2023-07-13 DIAGNOSIS — E16.2 HYPOGLYCEMIA: ICD-10-CM

## 2023-07-13 DIAGNOSIS — E78.5 DYSLIPIDEMIA, GOAL LDL BELOW 70: ICD-10-CM

## 2023-07-13 DIAGNOSIS — E55.9 VITAMIN D DEFICIENCY: ICD-10-CM

## 2023-07-13 DIAGNOSIS — Z79.4 TYPE 2 DIABETES MELLITUS WITH STAGE 3A CHRONIC KIDNEY DISEASE, WITH LONG-TERM CURRENT USE OF INSULIN (HCC): ICD-10-CM

## 2023-07-13 DIAGNOSIS — N18.31 TYPE 2 DIABETES MELLITUS WITH STAGE 3A CHRONIC KIDNEY DISEASE, WITH LONG-TERM CURRENT USE OF INSULIN (HCC): Primary | ICD-10-CM

## 2023-07-13 DIAGNOSIS — Z00.00 ROUTINE ADULT HEALTH MAINTENANCE: ICD-10-CM

## 2023-07-13 DIAGNOSIS — R80.9 ALBUMINURIA: ICD-10-CM

## 2023-07-13 DIAGNOSIS — E11.22 TYPE 2 DIABETES MELLITUS WITH STAGE 3A CHRONIC KIDNEY DISEASE, WITH LONG-TERM CURRENT USE OF INSULIN (HCC): ICD-10-CM

## 2023-07-13 DIAGNOSIS — I10 HTN, GOAL BELOW 130/80: ICD-10-CM

## 2023-07-13 DIAGNOSIS — E11.22 TYPE 2 DIABETES MELLITUS WITH STAGE 3A CHRONIC KIDNEY DISEASE, WITH LONG-TERM CURRENT USE OF INSULIN (HCC): Primary | ICD-10-CM

## 2023-07-13 DIAGNOSIS — Z12.11 SCREEN FOR COLON CANCER: ICD-10-CM

## 2023-07-13 DIAGNOSIS — Z79.4 TYPE 2 DIABETES MELLITUS WITH STAGE 3A CHRONIC KIDNEY DISEASE, WITH LONG-TERM CURRENT USE OF INSULIN (HCC): Primary | ICD-10-CM

## 2023-07-13 DIAGNOSIS — Z79.4 LONG TERM (CURRENT) USE OF INSULIN (HCC): ICD-10-CM

## 2023-07-13 LAB
25(OH)D3 SERPL-MCNC: 10.2 NG/ML (ref 30–100)
ALBUMIN SERPL-MCNC: 3.5 G/DL (ref 3.4–5)
ALBUMIN/GLOB SERPL: 1 (ref 0.8–1.7)
ALP SERPL-CCNC: 64 U/L (ref 45–117)
ALT SERPL-CCNC: 21 U/L (ref 16–61)
ANION GAP SERPL CALC-SCNC: 4 MMOL/L (ref 3–18)
AST SERPL-CCNC: 8 U/L (ref 10–38)
BASOPHILS # BLD: 0.1 K/UL (ref 0–0.1)
BASOPHILS NFR BLD: 2 % (ref 0–2)
BILIRUB SERPL-MCNC: 0.4 MG/DL (ref 0.2–1)
BUN SERPL-MCNC: 16 MG/DL (ref 7–18)
BUN/CREAT SERPL: 21 (ref 12–20)
CALCIUM SERPL-MCNC: 8.7 MG/DL (ref 8.5–10.1)
CHLORIDE SERPL-SCNC: 106 MMOL/L (ref 100–111)
CHOLEST SERPL-MCNC: 197 MG/DL
CO2 SERPL-SCNC: 26 MMOL/L (ref 21–32)
CREAT SERPL-MCNC: 0.77 MG/DL (ref 0.6–1.3)
CREAT UR-MCNC: 39 MG/DL (ref 30–125)
DIFFERENTIAL METHOD BLD: ABNORMAL
EOSINOPHIL # BLD: 0.6 K/UL (ref 0–0.4)
EOSINOPHIL NFR BLD: 7 % (ref 0–5)
ERYTHROCYTE [DISTWIDTH] IN BLOOD BY AUTOMATED COUNT: 12.2 % (ref 11.6–14.5)
EST. AVERAGE GLUCOSE BLD GHB EST-MCNC: 237 MG/DL
GLOBULIN SER CALC-MCNC: 3.4 G/DL (ref 2–4)
GLUCOSE SERPL-MCNC: 272 MG/DL (ref 74–99)
HBA1C MFR BLD: 9.9 % (ref 4.2–5.6)
HCT VFR BLD AUTO: 49.4 % (ref 36–48)
HDLC SERPL-MCNC: 43 MG/DL (ref 40–60)
HDLC SERPL: 4.6 (ref 0–5)
HGB BLD-MCNC: 16.9 G/DL (ref 13–16)
IMM GRANULOCYTES # BLD AUTO: 0.1 K/UL (ref 0–0.04)
IMM GRANULOCYTES NFR BLD AUTO: 1 % (ref 0–0.5)
LDLC SERPL CALC-MCNC: 112.8 MG/DL (ref 0–100)
LIPID PANEL: ABNORMAL
LYMPHOCYTES # BLD: 2.4 K/UL (ref 0.9–3.6)
LYMPHOCYTES NFR BLD: 30 % (ref 21–52)
MCH RBC QN AUTO: 32.5 PG (ref 24–34)
MCHC RBC AUTO-ENTMCNC: 34.2 G/DL (ref 31–37)
MCV RBC AUTO: 95 FL (ref 78–100)
MICROALBUMIN UR-MCNC: 26.4 MG/DL (ref 0–3)
MICROALBUMIN/CREAT UR-RTO: 677 MG/G (ref 0–30)
MONOCYTES # BLD: 0.8 K/UL (ref 0.05–1.2)
MONOCYTES NFR BLD: 10 % (ref 3–10)
NEUTS SEG # BLD: 4 K/UL (ref 1.8–8)
NEUTS SEG NFR BLD: 50 % (ref 40–73)
NRBC # BLD: 0 K/UL (ref 0–0.01)
NRBC BLD-RTO: 0 PER 100 WBC
PLATELET # BLD AUTO: 249 K/UL (ref 135–420)
PMV BLD AUTO: 9.5 FL (ref 9.2–11.8)
POTASSIUM SERPL-SCNC: 4.3 MMOL/L (ref 3.5–5.5)
PROT SERPL-MCNC: 6.9 G/DL (ref 6.4–8.2)
RBC # BLD AUTO: 5.2 M/UL (ref 4.35–5.65)
SODIUM SERPL-SCNC: 136 MMOL/L (ref 136–145)
T4 FREE SERPL-MCNC: 1.1 NG/DL (ref 0.7–1.5)
TRIGL SERPL-MCNC: 206 MG/DL
TSH SERPL DL<=0.05 MIU/L-ACNC: 1.16 UIU/ML (ref 0.36–3.74)
VLDLC SERPL CALC-MCNC: 41.2 MG/DL
WBC # BLD AUTO: 8 K/UL (ref 4.6–13.2)

## 2023-07-13 PROCEDURE — 84443 ASSAY THYROID STIM HORMONE: CPT

## 2023-07-13 PROCEDURE — 82570 ASSAY OF URINE CREATININE: CPT

## 2023-07-13 PROCEDURE — 36415 COLL VENOUS BLD VENIPUNCTURE: CPT

## 2023-07-13 PROCEDURE — 85025 COMPLETE CBC W/AUTO DIFF WBC: CPT

## 2023-07-13 PROCEDURE — 82043 UR ALBUMIN QUANTITATIVE: CPT

## 2023-07-13 PROCEDURE — 80061 LIPID PANEL: CPT

## 2023-07-13 PROCEDURE — 82306 VITAMIN D 25 HYDROXY: CPT

## 2023-07-13 PROCEDURE — 80053 COMPREHEN METABOLIC PANEL: CPT

## 2023-07-13 PROCEDURE — 99214 OFFICE O/P EST MOD 30 MIN: CPT | Performed by: STUDENT IN AN ORGANIZED HEALTH CARE EDUCATION/TRAINING PROGRAM

## 2023-07-13 PROCEDURE — 3075F SYST BP GE 130 - 139MM HG: CPT | Performed by: STUDENT IN AN ORGANIZED HEALTH CARE EDUCATION/TRAINING PROGRAM

## 2023-07-13 PROCEDURE — 83036 HEMOGLOBIN GLYCOSYLATED A1C: CPT

## 2023-07-13 PROCEDURE — 3079F DIAST BP 80-89 MM HG: CPT | Performed by: STUDENT IN AN ORGANIZED HEALTH CARE EDUCATION/TRAINING PROGRAM

## 2023-07-13 PROCEDURE — 84439 ASSAY OF FREE THYROXINE: CPT

## 2023-07-13 RX ORDER — SIMVASTATIN 40 MG
40 TABLET ORAL NIGHTLY
Qty: 90 TABLET | Refills: 3 | Status: SHIPPED | OUTPATIENT
Start: 2023-07-13

## 2023-07-13 RX ORDER — ERGOCALCIFEROL 1.25 MG/1
50000 CAPSULE ORAL WEEKLY
Qty: 12 CAPSULE | Refills: 3 | Status: SHIPPED | OUTPATIENT
Start: 2023-07-13

## 2023-07-13 RX ORDER — LOSARTAN POTASSIUM 50 MG/1
50 TABLET ORAL DAILY
Qty: 90 TABLET | Refills: 1 | Status: SHIPPED | OUTPATIENT
Start: 2023-07-13

## 2023-07-13 RX ORDER — INSULIN GLARGINE 100 [IU]/ML
70 INJECTION, SOLUTION SUBCUTANEOUS NIGHTLY
Qty: 5 ADJUSTABLE DOSE PRE-FILLED PEN SYRINGE | Refills: 5 | Status: SHIPPED | OUTPATIENT
Start: 2023-07-13

## 2023-07-13 ASSESSMENT — PATIENT HEALTH QUESTIONNAIRE - PHQ9
SUM OF ALL RESPONSES TO PHQ QUESTIONS 1-9: 0
SUM OF ALL RESPONSES TO PHQ9 QUESTIONS 1 & 2: 0
SUM OF ALL RESPONSES TO PHQ QUESTIONS 1-9: 0
SUM OF ALL RESPONSES TO PHQ QUESTIONS 1-9: 0
2. FEELING DOWN, DEPRESSED OR HOPELESS: 0
SUM OF ALL RESPONSES TO PHQ QUESTIONS 1-9: 0
1. LITTLE INTEREST OR PLEASURE IN DOING THINGS: 0

## 2023-07-13 NOTE — PATIENT INSTRUCTIONS
Goal would be to start a GLP med (ex Ozempic)  and SGLT2 med (ex Britney Balls) with the intention of discontinuing Pioglitazone and lowering Insulin as much as possible. Once we reach 1mg dose for Ozempic, OK to stop Pioglitazone. Goal would be to increase Ozempic to max tolerated dose (2mg)  If glucose average is improving, can then start to reduce dose of Insulin with a target of average ~150s. Consider starting to donate blood regularly to reduce hemoglobin levels. If you experience nausea, you may find the following tips helpful:  Eat smaller meals  Try splitting your 3 daily meals into 4 or more smaller ones  Stop eating when you feel full  Avoid fat or fatty foods  Try eating bland foods like toast, crackers, or rice  Avoid skipping meals    Have questions about Ozempic? Call [phone] Monday through Friday, 9:00 AM to 6:00 PM ET, to get live one-on-one support from a [facility] .

## 2023-07-19 ENCOUNTER — TELEPHONE (OUTPATIENT)
Dept: FAMILY MEDICINE CLINIC | Facility: CLINIC | Age: 48
End: 2023-07-19

## 2023-08-09 NOTE — TELEPHONE ENCOUNTER
Received prior Auth approval with the following details;    Beena Concepción 2MG/3ML, use as directed (3ml per month), is approved through 07/19/2024 or until  coverage for the medication is no longer available under your benefit plan or the medication becomes  subject to a pharmacy benefit coverage requirement    Will call pharmacy to process when they open.

## 2023-08-10 ENCOUNTER — TELEPHONE (OUTPATIENT)
Dept: FAMILY MEDICINE CLINIC | Facility: CLINIC | Age: 48
End: 2023-08-10

## 2023-08-10 NOTE — TELEPHONE ENCOUNTER
Received Prior Auth for medication Donzell Dye, completed it, however it was denied. Please send alternative.

## 2023-08-20 DIAGNOSIS — R80.9 ALBUMINURIA: ICD-10-CM

## 2023-08-20 DIAGNOSIS — N18.31 TYPE 2 DIABETES MELLITUS WITH STAGE 3A CHRONIC KIDNEY DISEASE, WITH LONG-TERM CURRENT USE OF INSULIN (HCC): ICD-10-CM

## 2023-08-20 DIAGNOSIS — Z79.4 TYPE 2 DIABETES MELLITUS WITH STAGE 3A CHRONIC KIDNEY DISEASE, WITH LONG-TERM CURRENT USE OF INSULIN (HCC): ICD-10-CM

## 2023-08-20 DIAGNOSIS — Z79.4 LONG TERM (CURRENT) USE OF INSULIN (HCC): ICD-10-CM

## 2023-08-20 DIAGNOSIS — N18.31 STAGE 3A CHRONIC KIDNEY DISEASE (HCC): ICD-10-CM

## 2023-08-20 DIAGNOSIS — E11.22 TYPE 2 DIABETES MELLITUS WITH STAGE 3A CHRONIC KIDNEY DISEASE, WITH LONG-TERM CURRENT USE OF INSULIN (HCC): ICD-10-CM

## 2023-08-20 DIAGNOSIS — E78.5 DYSLIPIDEMIA, GOAL LDL BELOW 70: ICD-10-CM

## 2023-08-20 DIAGNOSIS — G47.33 OSA (OBSTRUCTIVE SLEEP APNEA): ICD-10-CM

## 2023-08-20 DIAGNOSIS — I10 HTN, GOAL BELOW 130/80: ICD-10-CM

## 2023-08-21 RX ORDER — SEMAGLUTIDE 0.68 MG/ML
INJECTION, SOLUTION SUBCUTANEOUS
Qty: 3 ML | Refills: 1 | Status: SHIPPED | OUTPATIENT
Start: 2023-08-21

## 2023-08-21 NOTE — TELEPHONE ENCOUNTER
LM for pt, Ozempic refill approved w/dosage increase; pt will contact our office if any questions/concerns.

## 2023-08-21 NOTE — TELEPHONE ENCOUNTER
Last note reviewed. Recommend a dosage increase to 0.5 mg injected weekly. Rx adjusted. Please advise patient and confirm tolerability of medication to increase dosage.

## 2023-08-21 NOTE — TELEPHONE ENCOUNTER
Last seen: 07/13/23    Last filled: 07/13/23    Dispense Quantity: 3 mL Refills: 0     No future appointments.

## 2023-10-05 DIAGNOSIS — J41.1 MUCOPURULENT CHRONIC BRONCHITIS (HCC): ICD-10-CM

## 2023-10-05 RX ORDER — ALBUTEROL SULFATE 90 UG/1
AEROSOL, METERED RESPIRATORY (INHALATION)
Qty: 9 G | Refills: 1 | Status: SHIPPED | OUTPATIENT
Start: 2023-10-05

## 2023-10-05 NOTE — TELEPHONE ENCOUNTER
Silvestre Torre called for their medication refill. Last Office visit:  7/13/2023    Last Filled: 5/18/2023; Qty 9 g w/ 5 refills     Follow up visit:  No future appointments.

## 2023-10-05 NOTE — TELEPHONE ENCOUNTER
From: Carmen Tovar  To:  Office of Dr. Salud Espinoza: 10/5/2023 10:50 AM EDT  Subject: Medication Renewal Request    Refills have been requested for the following medications:     albuterol sulfate HFA (PROVENTIL;VENTOLIN;PROAIR) 108 (90 Base) MCG/ACT inhaler [Dr. Venu Campbell, DO]    Preferred pharmacy: [address] [place] [address] [phone] - F [phone]

## 2023-12-14 DIAGNOSIS — J41.1 MUCOPURULENT CHRONIC BRONCHITIS (HCC): ICD-10-CM

## 2023-12-15 RX ORDER — ALBUTEROL SULFATE 90 UG/1
AEROSOL, METERED RESPIRATORY (INHALATION)
Qty: 9 G | Refills: 0 | Status: SHIPPED | OUTPATIENT
Start: 2023-12-15 | End: 2024-01-04

## 2024-01-02 DIAGNOSIS — J41.1 MUCOPURULENT CHRONIC BRONCHITIS (HCC): ICD-10-CM

## 2024-01-03 NOTE — TELEPHONE ENCOUNTER
Patrick Garcia called for their medication refill.    Last Office visit:  7/13/2023    Last Filled: 12/15/2023; Qty 9 g w/ 0 refills     Follow up visit:  No future appointments.

## 2024-01-04 DIAGNOSIS — J41.1 MUCOPURULENT CHRONIC BRONCHITIS (HCC): ICD-10-CM

## 2024-01-04 RX ORDER — ALBUTEROL SULFATE 90 UG/1
AEROSOL, METERED RESPIRATORY (INHALATION)
Qty: 9 G | Refills: 0 | Status: SHIPPED | OUTPATIENT
Start: 2024-01-04

## 2024-01-05 RX ORDER — ALBUTEROL SULFATE 90 UG/1
AEROSOL, METERED RESPIRATORY (INHALATION)
Qty: 9 G | Refills: 0 | OUTPATIENT
Start: 2024-01-05

## 2024-01-08 ENCOUNTER — TELEPHONE (OUTPATIENT)
Dept: FAMILY MEDICINE CLINIC | Facility: CLINIC | Age: 49
End: 2024-01-08

## 2024-01-08 NOTE — TELEPHONE ENCOUNTER
Pt called stating he was experiencing RT arm paralyses where he was progressively losing feeling and dropping things. Confirmed with Dr. Ovalle and advised pt he would need to go to the ER.

## 2024-01-20 SDOH — HEALTH STABILITY: PHYSICAL HEALTH: ON AVERAGE, HOW MANY DAYS PER WEEK DO YOU ENGAGE IN MODERATE TO STRENUOUS EXERCISE (LIKE A BRISK WALK)?: 1 DAY

## 2024-01-20 SDOH — HEALTH STABILITY: PHYSICAL HEALTH: ON AVERAGE, HOW MANY MINUTES DO YOU ENGAGE IN EXERCISE AT THIS LEVEL?: 10 MIN

## 2024-01-23 ENCOUNTER — OFFICE VISIT (OUTPATIENT)
Dept: FAMILY MEDICINE CLINIC | Facility: CLINIC | Age: 49
End: 2024-01-23
Payer: COMMERCIAL

## 2024-01-23 VITALS
HEART RATE: 93 BPM | WEIGHT: 228 LBS | TEMPERATURE: 98.5 F | HEIGHT: 71 IN | RESPIRATION RATE: 16 BRPM | SYSTOLIC BLOOD PRESSURE: 102 MMHG | OXYGEN SATURATION: 94 % | BODY MASS INDEX: 31.92 KG/M2 | DIASTOLIC BLOOD PRESSURE: 60 MMHG

## 2024-01-23 DIAGNOSIS — J41.1 MUCOPURULENT CHRONIC BRONCHITIS (HCC): ICD-10-CM

## 2024-01-23 DIAGNOSIS — Z87.891 PERSONAL HISTORY OF TOBACCO USE: ICD-10-CM

## 2024-01-23 DIAGNOSIS — E11.9 TYPE 2 DIABETES MELLITUS WITHOUT COMPLICATION, WITH LONG-TERM CURRENT USE OF INSULIN (HCC): ICD-10-CM

## 2024-01-23 DIAGNOSIS — Z79.4 TYPE 2 DIABETES MELLITUS WITHOUT COMPLICATION, WITH LONG-TERM CURRENT USE OF INSULIN (HCC): ICD-10-CM

## 2024-01-23 DIAGNOSIS — Z79.4 LONG TERM (CURRENT) USE OF INSULIN (HCC): ICD-10-CM

## 2024-01-23 DIAGNOSIS — I63.512 ACUTE ISCHEMIC LEFT MCA STROKE (HCC): Primary | ICD-10-CM

## 2024-01-23 DIAGNOSIS — I10 HTN, GOAL BELOW 130/80: ICD-10-CM

## 2024-01-23 DIAGNOSIS — F17.200 NICOTINE DEPENDENCE WITH CURRENT USE: ICD-10-CM

## 2024-01-23 DIAGNOSIS — E11.22 TYPE 2 DIABETES MELLITUS WITH STAGE 3A CHRONIC KIDNEY DISEASE, WITH LONG-TERM CURRENT USE OF INSULIN (HCC): ICD-10-CM

## 2024-01-23 DIAGNOSIS — Z79.4 TYPE 2 DIABETES MELLITUS WITH STAGE 3A CHRONIC KIDNEY DISEASE, WITH LONG-TERM CURRENT USE OF INSULIN (HCC): ICD-10-CM

## 2024-01-23 DIAGNOSIS — N18.31 TYPE 2 DIABETES MELLITUS WITH STAGE 3A CHRONIC KIDNEY DISEASE, WITH LONG-TERM CURRENT USE OF INSULIN (HCC): ICD-10-CM

## 2024-01-23 DIAGNOSIS — G47.33 OSA (OBSTRUCTIVE SLEEP APNEA): ICD-10-CM

## 2024-01-23 DIAGNOSIS — E55.9 VITAMIN D DEFICIENCY: ICD-10-CM

## 2024-01-23 DIAGNOSIS — E78.5 DYSLIPIDEMIA, GOAL LDL BELOW 70: ICD-10-CM

## 2024-01-23 LAB — HBA1C MFR BLD: 10 %

## 2024-01-23 PROCEDURE — 83036 HEMOGLOBIN GLYCOSYLATED A1C: CPT | Performed by: STUDENT IN AN ORGANIZED HEALTH CARE EDUCATION/TRAINING PROGRAM

## 2024-01-23 PROCEDURE — 3074F SYST BP LT 130 MM HG: CPT | Performed by: STUDENT IN AN ORGANIZED HEALTH CARE EDUCATION/TRAINING PROGRAM

## 2024-01-23 PROCEDURE — G0296 VISIT TO DETERM LDCT ELIG: HCPCS | Performed by: STUDENT IN AN ORGANIZED HEALTH CARE EDUCATION/TRAINING PROGRAM

## 2024-01-23 PROCEDURE — 3078F DIAST BP <80 MM HG: CPT | Performed by: STUDENT IN AN ORGANIZED HEALTH CARE EDUCATION/TRAINING PROGRAM

## 2024-01-23 PROCEDURE — 99214 OFFICE O/P EST MOD 30 MIN: CPT | Performed by: STUDENT IN AN ORGANIZED HEALTH CARE EDUCATION/TRAINING PROGRAM

## 2024-01-23 RX ORDER — ALBUTEROL SULFATE 90 UG/1
2 AEROSOL, METERED RESPIRATORY (INHALATION) EVERY 4 HOURS PRN
Qty: 18 G | Refills: 1 | Status: SHIPPED | OUTPATIENT
Start: 2024-01-23

## 2024-01-23 RX ORDER — ATORVASTATIN CALCIUM 40 MG/1
40 TABLET, FILM COATED ORAL
COMMUNITY
Start: 2024-01-09 | End: 2024-01-23 | Stop reason: SDUPTHER

## 2024-01-23 RX ORDER — BUPROPION HYDROCHLORIDE 150 MG/1
150 TABLET, EXTENDED RELEASE ORAL 2 TIMES DAILY
Qty: 60 TABLET | Refills: 3 | Status: SHIPPED | OUTPATIENT
Start: 2024-01-23

## 2024-01-23 RX ORDER — INSULIN GLARGINE 100 [IU]/ML
40 INJECTION, SOLUTION SUBCUTANEOUS 2 TIMES DAILY
Qty: 5 ADJUSTABLE DOSE PRE-FILLED PEN SYRINGE | Refills: 5 | Status: SHIPPED | OUTPATIENT
Start: 2024-01-23

## 2024-01-23 RX ORDER — PIOGLITAZONEHYDROCHLORIDE 30 MG/1
30 TABLET ORAL DAILY
Qty: 90 TABLET | Refills: 3 | Status: SHIPPED | OUTPATIENT
Start: 2024-01-23

## 2024-01-23 RX ORDER — ATORVASTATIN CALCIUM 40 MG/1
40 TABLET, FILM COATED ORAL
Qty: 90 TABLET | Refills: 3 | Status: SHIPPED | OUTPATIENT
Start: 2024-01-23

## 2024-01-23 RX ORDER — ASPIRIN 81 MG/1
81 TABLET ORAL DAILY
Qty: 90 TABLET | Refills: 3 | Status: SHIPPED | OUTPATIENT
Start: 2024-01-23

## 2024-01-23 RX ORDER — ASPIRIN 81 MG/1
81 TABLET ORAL DAILY
COMMUNITY
Start: 2024-01-09 | End: 2024-01-23 | Stop reason: SDUPTHER

## 2024-01-23 RX ORDER — SEMAGLUTIDE 0.68 MG/ML
INJECTION, SOLUTION SUBCUTANEOUS
Qty: 3 ML | Refills: 1 | Status: SHIPPED | OUTPATIENT
Start: 2024-01-23

## 2024-01-23 RX ORDER — NICOTINE 21 MG/24HR
1 PATCH, TRANSDERMAL 24 HOURS TRANSDERMAL EVERY 24 HOURS
COMMUNITY
Start: 2024-01-10

## 2024-01-23 SDOH — ECONOMIC STABILITY: INCOME INSECURITY: HOW HARD IS IT FOR YOU TO PAY FOR THE VERY BASICS LIKE FOOD, HOUSING, MEDICAL CARE, AND HEATING?: VERY HARD

## 2024-01-23 SDOH — ECONOMIC STABILITY: FOOD INSECURITY: WITHIN THE PAST 12 MONTHS, YOU WORRIED THAT YOUR FOOD WOULD RUN OUT BEFORE YOU GOT MONEY TO BUY MORE.: NEVER TRUE

## 2024-01-23 SDOH — ECONOMIC STABILITY: FOOD INSECURITY: WITHIN THE PAST 12 MONTHS, THE FOOD YOU BOUGHT JUST DIDN'T LAST AND YOU DIDN'T HAVE MONEY TO GET MORE.: NEVER TRUE

## 2024-01-23 ASSESSMENT — PATIENT HEALTH QUESTIONNAIRE - PHQ9
SUM OF ALL RESPONSES TO PHQ QUESTIONS 1-9: 0
SUM OF ALL RESPONSES TO PHQ9 QUESTIONS 1 & 2: 0
1. LITTLE INTEREST OR PLEASURE IN DOING THINGS: 0
2. FEELING DOWN, DEPRESSED OR HOPELESS: 0
SUM OF ALL RESPONSES TO PHQ QUESTIONS 1-9: 0
5. POOR APPETITE OR OVEREATING: 0
7. TROUBLE CONCENTRATING ON THINGS, SUCH AS READING THE NEWSPAPER OR WATCHING TELEVISION: 0
4. FEELING TIRED OR HAVING LITTLE ENERGY: 0
8. MOVING OR SPEAKING SO SLOWLY THAT OTHER PEOPLE COULD HAVE NOTICED. OR THE OPPOSITE, BEING SO FIGETY OR RESTLESS THAT YOU HAVE BEEN MOVING AROUND A LOT MORE THAN USUAL: 0
9. THOUGHTS THAT YOU WOULD BE BETTER OFF DEAD, OR OF HURTING YOURSELF: 0
3. TROUBLE FALLING OR STAYING ASLEEP: 0
SUM OF ALL RESPONSES TO PHQ QUESTIONS 1-9: 0
10. IF YOU CHECKED OFF ANY PROBLEMS, HOW DIFFICULT HAVE THESE PROBLEMS MADE IT FOR YOU TO DO YOUR WORK, TAKE CARE OF THINGS AT HOME, OR GET ALONG WITH OTHER PEOPLE: 0
SUM OF ALL RESPONSES TO PHQ QUESTIONS 1-9: 0
6. FEELING BAD ABOUT YOURSELF - OR THAT YOU ARE A FAILURE OR HAVE LET YOURSELF OR YOUR FAMILY DOWN: 0

## 2024-01-23 ASSESSMENT — ENCOUNTER SYMPTOMS
DIARRHEA: 0
BACK PAIN: 0
NAUSEA: 0
VOMITING: 0
CHEST TIGHTNESS: 0
COUGH: 0
EYE PAIN: 0
ABDOMINAL PAIN: 0
SORE THROAT: 0
SHORTNESS OF BREATH: 0
CONSTIPATION: 0
RHINORRHEA: 0

## 2024-01-23 ASSESSMENT — ANXIETY QUESTIONNAIRES
4. TROUBLE RELAXING: 0
5. BEING SO RESTLESS THAT IT IS HARD TO SIT STILL: 0
1. FEELING NERVOUS, ANXIOUS, OR ON EDGE: 0
IF YOU CHECKED OFF ANY PROBLEMS ON THIS QUESTIONNAIRE, HOW DIFFICULT HAVE THESE PROBLEMS MADE IT FOR YOU TO DO YOUR WORK, TAKE CARE OF THINGS AT HOME, OR GET ALONG WITH OTHER PEOPLE: NOT DIFFICULT AT ALL
GAD7 TOTAL SCORE: 0
6. BECOMING EASILY ANNOYED OR IRRITABLE: 0
2. NOT BEING ABLE TO STOP OR CONTROL WORRYING: 0
7. FEELING AFRAID AS IF SOMETHING AWFUL MIGHT HAPPEN: 0
3. WORRYING TOO MUCH ABOUT DIFFERENT THINGS: 0

## 2024-01-23 NOTE — PROGRESS NOTES
Patrick Garcia is a 48 y.o. male (: 1975) presenting to address:    Chief Complaint   Patient presents with    [facility]     FMLA form; needs release to return to work; stroke 2 weeks ago       Vitals:    24 1250   BP: 102/60   Pulse: 93   Resp: 16   Temp: 98.5 °F (36.9 °C)   SpO2: 94%       Coordination of Care Questionaire:   1. \"Have you been to the ER, urgent care clinic since your last visit?  Hospitalized since your last visit?\"  [facility] for stroke 2 weeks ago; pt has Holter Monitor    2. \"Have you seen or consulted any other health care providers outside of the [facility] since your last visit?\" No     3. For patients aged 45-75: Has the patient had a colonoscopy / FIT/ Cologuard? No, pt has cologuard kit at home, difficult to complete with weakness in right arm      If the patient is female:    4. For patients aged 40-74: Has the patient had a mammogram within the past 2 years? NA - based on age or sex      5. For patients aged 21-65: Has the patient had a pap smear? NA - based on age or sex    Advanced Directive:   1. Do you have an Advanced Directive? Yes    2. Would you like information on Advanced Directives? No    Pt declined flu vaccine.      
compliance with yearly annual lung cancer screenings and willingness to undergo diagnosis and treatment if screening scan is positive.  In addition, the patient was counseled regarding the importance of remaining smoke free and/or total smoking cessation.    Also reviewed the following if the patient has Medicare that as of February 10, 2022, Medicare only covers LDCT screening in patients aged 50-77 with at least a 20 pack-year smoking history who currently smoke or have quit in the last 15 years

## 2024-02-21 DIAGNOSIS — I63.512 ACUTE ISCHEMIC LEFT MCA STROKE (HCC): Primary | ICD-10-CM

## 2024-03-22 ENCOUNTER — TELEPHONE (OUTPATIENT)
Dept: FAMILY MEDICINE CLINIC | Facility: CLINIC | Age: 49
End: 2024-03-22

## 2024-04-09 DIAGNOSIS — J41.1 MUCOPURULENT CHRONIC BRONCHITIS (HCC): ICD-10-CM

## 2024-04-09 RX ORDER — ALBUTEROL SULFATE 90 UG/1
2 AEROSOL, METERED RESPIRATORY (INHALATION) EVERY 4 HOURS PRN
Qty: 18 G | Refills: 1 | Status: SHIPPED | OUTPATIENT
Start: 2024-04-09

## 2024-04-09 NOTE — TELEPHONE ENCOUNTER
Last visit: 1/23/24  Next visit:   Future Appointments   Date Time Provider Department Center   5/2/2024  9:15 AM Kasi Good DO BSMA BS AMB     Last filled: 1/23/24; proventil inhaler; qty 18g w/1 refill

## 2024-05-29 ENCOUNTER — TELEPHONE (OUTPATIENT)
Dept: FAMILY MEDICINE CLINIC | Facility: CLINIC | Age: 49
End: 2024-05-29

## 2024-05-29 NOTE — TELEPHONE ENCOUNTER
lvm to  appt on 6/7/24 due to provider being out in the afternoon. Pt informed to call to rs for earlier same day         2 nd attempt to contact pt to  appt on 6/7/24 due to provider being out in the afternoon 5/31/24 lvm

## 2024-06-28 ENCOUNTER — TELEPHONE (OUTPATIENT)
Dept: FAMILY MEDICINE CLINIC | Facility: CLINIC | Age: 49
End: 2024-06-28

## 2024-06-28 NOTE — TELEPHONE ENCOUNTER
Submitted and received approval for Ozempic; effective through 6/27/25; pharmacy notified; pharmacy will notify patient when ready for ; will scan approval letter into pt's chart.

## 2024-07-05 ENCOUNTER — TELEPHONE (OUTPATIENT)
Dept: FAMILY MEDICINE CLINIC | Facility: CLINIC | Age: 49
End: 2024-07-05

## 2024-07-05 NOTE — TELEPHONE ENCOUNTER
LVM advising patient to contact their insurance company to update  prior to upcoming appointment - patient's  in EPIC matches patient's  on scanned 's license.

## 2025-04-07 SDOH — ECONOMIC STABILITY: TRANSPORTATION INSECURITY
IN THE PAST 12 MONTHS, HAS THE LACK OF TRANSPORTATION KEPT YOU FROM MEDICAL APPOINTMENTS OR FROM GETTING MEDICATIONS?: YES

## 2025-04-07 SDOH — ECONOMIC STABILITY: FOOD INSECURITY: WITHIN THE PAST 12 MONTHS, YOU WORRIED THAT YOUR FOOD WOULD RUN OUT BEFORE YOU GOT MONEY TO BUY MORE.: NEVER TRUE

## 2025-04-07 SDOH — ECONOMIC STABILITY: FOOD INSECURITY: WITHIN THE PAST 12 MONTHS, THE FOOD YOU BOUGHT JUST DIDN'T LAST AND YOU DIDN'T HAVE MONEY TO GET MORE.: NEVER TRUE

## 2025-04-07 SDOH — ECONOMIC STABILITY: INCOME INSECURITY: IN THE LAST 12 MONTHS, WAS THERE A TIME WHEN YOU WERE NOT ABLE TO PAY THE MORTGAGE OR RENT ON TIME?: YES

## 2025-04-07 SDOH — ECONOMIC STABILITY: TRANSPORTATION INSECURITY
IN THE PAST 12 MONTHS, HAS LACK OF TRANSPORTATION KEPT YOU FROM MEETINGS, WORK, OR FROM GETTING THINGS NEEDED FOR DAILY LIVING?: YES

## 2025-04-08 ENCOUNTER — OFFICE VISIT (OUTPATIENT)
Dept: FAMILY MEDICINE CLINIC | Facility: CLINIC | Age: 50
End: 2025-04-08
Payer: MEDICAID

## 2025-04-08 ENCOUNTER — HOSPITAL ENCOUNTER (OUTPATIENT)
Facility: HOSPITAL | Age: 50
Setting detail: SPECIMEN
Discharge: HOME | End: 2025-04-11

## 2025-04-08 VITALS
OXYGEN SATURATION: 93 % | TEMPERATURE: 98.1 F | BODY MASS INDEX: 33.91 KG/M2 | HEART RATE: 74 BPM | WEIGHT: 242.2 LBS | SYSTOLIC BLOOD PRESSURE: 140 MMHG | DIASTOLIC BLOOD PRESSURE: 90 MMHG | RESPIRATION RATE: 12 BRPM | HEIGHT: 71 IN

## 2025-04-08 DIAGNOSIS — Z79.4 TYPE 2 DIABETES MELLITUS WITH STAGE 3A CHRONIC KIDNEY DISEASE, WITH LONG-TERM CURRENT USE OF INSULIN (HCC): ICD-10-CM

## 2025-04-08 DIAGNOSIS — G47.33 OSA (OBSTRUCTIVE SLEEP APNEA): ICD-10-CM

## 2025-04-08 DIAGNOSIS — Z00.00 ENCOUNTER FOR WELL ADULT EXAM WITHOUT ABNORMAL FINDINGS: Primary | ICD-10-CM

## 2025-04-08 DIAGNOSIS — J41.1 MUCOPURULENT CHRONIC BRONCHITIS (HCC): ICD-10-CM

## 2025-04-08 DIAGNOSIS — Z12.11 ENCOUNTER FOR SCREENING FOR MALIGNANT NEOPLASM OF COLON: ICD-10-CM

## 2025-04-08 DIAGNOSIS — E11.22 TYPE 2 DIABETES MELLITUS WITH STAGE 3A CHRONIC KIDNEY DISEASE, WITH LONG-TERM CURRENT USE OF INSULIN (HCC): ICD-10-CM

## 2025-04-08 DIAGNOSIS — F17.200 NICOTINE DEPENDENCE WITH CURRENT USE: ICD-10-CM

## 2025-04-08 DIAGNOSIS — Z87.891 PERSONAL HISTORY OF TOBACCO USE: ICD-10-CM

## 2025-04-08 DIAGNOSIS — E11.42 DIABETIC POLYNEUROPATHY ASSOCIATED WITH TYPE 2 DIABETES MELLITUS: ICD-10-CM

## 2025-04-08 DIAGNOSIS — Z12.5 ENCOUNTER FOR SCREENING FOR MALIGNANT NEOPLASM OF PROSTATE: ICD-10-CM

## 2025-04-08 DIAGNOSIS — E78.5 DYSLIPIDEMIA, GOAL LDL BELOW 70: ICD-10-CM

## 2025-04-08 DIAGNOSIS — N18.31 TYPE 2 DIABETES MELLITUS WITH STAGE 3A CHRONIC KIDNEY DISEASE, WITH LONG-TERM CURRENT USE OF INSULIN (HCC): ICD-10-CM

## 2025-04-08 DIAGNOSIS — I10 HTN, GOAL BELOW 130/80: ICD-10-CM

## 2025-04-08 DIAGNOSIS — I63.512 ACUTE ISCHEMIC LEFT MCA STROKE (HCC): ICD-10-CM

## 2025-04-08 LAB
HBA1C MFR BLD: 8 %
SENTARA SPECIMEN COLLECTION: NORMAL

## 2025-04-08 PROCEDURE — 99396 PREV VISIT EST AGE 40-64: CPT | Performed by: STUDENT IN AN ORGANIZED HEALTH CARE EDUCATION/TRAINING PROGRAM

## 2025-04-08 PROCEDURE — 99214 OFFICE O/P EST MOD 30 MIN: CPT | Performed by: STUDENT IN AN ORGANIZED HEALTH CARE EDUCATION/TRAINING PROGRAM

## 2025-04-08 PROCEDURE — 3080F DIAST BP >= 90 MM HG: CPT | Performed by: STUDENT IN AN ORGANIZED HEALTH CARE EDUCATION/TRAINING PROGRAM

## 2025-04-08 PROCEDURE — 99001 SPECIMEN HANDLING PT-LAB: CPT

## 2025-04-08 PROCEDURE — 83036 HEMOGLOBIN GLYCOSYLATED A1C: CPT | Performed by: STUDENT IN AN ORGANIZED HEALTH CARE EDUCATION/TRAINING PROGRAM

## 2025-04-08 PROCEDURE — G0296 VISIT TO DETERM LDCT ELIG: HCPCS | Performed by: STUDENT IN AN ORGANIZED HEALTH CARE EDUCATION/TRAINING PROGRAM

## 2025-04-08 PROCEDURE — 3077F SYST BP >= 140 MM HG: CPT | Performed by: STUDENT IN AN ORGANIZED HEALTH CARE EDUCATION/TRAINING PROGRAM

## 2025-04-08 RX ORDER — HYDROCHLOROTHIAZIDE 12.5 MG/1
CAPSULE ORAL
Qty: 2 EACH | Refills: 5 | Status: SHIPPED | OUTPATIENT
Start: 2025-04-08

## 2025-04-08 RX ORDER — INSULIN GLARGINE 100 [IU]/ML
50 INJECTION, SOLUTION SUBCUTANEOUS 2 TIMES DAILY
Qty: 5 ADJUSTABLE DOSE PRE-FILLED PEN SYRINGE | Refills: 5 | Status: SHIPPED | OUTPATIENT
Start: 2025-04-08

## 2025-04-08 RX ORDER — ASPIRIN 81 MG/1
81 TABLET ORAL DAILY
Qty: 90 TABLET | Refills: 3 | Status: SHIPPED | OUTPATIENT
Start: 2025-04-08

## 2025-04-08 RX ORDER — BUPROPION HYDROCHLORIDE 150 MG/1
150 TABLET, EXTENDED RELEASE ORAL 2 TIMES DAILY
Qty: 60 TABLET | Refills: 3 | Status: SHIPPED | OUTPATIENT
Start: 2025-04-08

## 2025-04-08 RX ORDER — ALBUTEROL SULFATE 90 UG/1
2 INHALANT RESPIRATORY (INHALATION) EVERY 4 HOURS PRN
Qty: 18 G | Refills: 1 | Status: SHIPPED | OUTPATIENT
Start: 2025-04-08

## 2025-04-08 RX ORDER — ATORVASTATIN CALCIUM 40 MG/1
40 TABLET, FILM COATED ORAL
Qty: 90 TABLET | Refills: 3 | Status: SHIPPED | OUTPATIENT
Start: 2025-04-08

## 2025-04-08 RX ORDER — LOSARTAN POTASSIUM 50 MG/1
50 TABLET ORAL DAILY
Qty: 90 TABLET | Refills: 1 | Status: SHIPPED | OUTPATIENT
Start: 2025-04-08

## 2025-04-08 ASSESSMENT — PATIENT HEALTH QUESTIONNAIRE - PHQ9
2. FEELING DOWN, DEPRESSED OR HOPELESS: NOT AT ALL
SUM OF ALL RESPONSES TO PHQ QUESTIONS 1-9: 0
SUM OF ALL RESPONSES TO PHQ QUESTIONS 1-9: 0
1. LITTLE INTEREST OR PLEASURE IN DOING THINGS: NOT AT ALL
SUM OF ALL RESPONSES TO PHQ QUESTIONS 1-9: 0
SUM OF ALL RESPONSES TO PHQ QUESTIONS 1-9: 0

## 2025-04-08 ASSESSMENT — ENCOUNTER SYMPTOMS
ABDOMINAL PAIN: 0
BACK PAIN: 0
SORE THROAT: 0
EYE PAIN: 0
CHEST TIGHTNESS: 0
COUGH: 1
NAUSEA: 0
DIARRHEA: 0
SHORTNESS OF BREATH: 0
RHINORRHEA: 0
CONSTIPATION: 0
VOMITING: 0

## 2025-04-08 NOTE — PATIENT INSTRUCTIONS
[facility] [address] [address]  Phone:  [phone]         Learning About Lung Cancer Screening  What is screening for lung cancer?     Lung cancer screening is a way to find some lung cancers early, before a person has any symptoms of the cancer.  Lung cancer screening may help those who have the highest risk for lung cancer--people age 50 and older who are or were heavy smokers. For most people, who aren't at increased risk, screening for lung cancer probably isn't helpful.  Screening won't prevent cancer. And it may not find all lung cancers. Lung cancer screening may lower the risk of dying from lung cancer in a small number of people.  How is it done?  Lung cancer screening is done with a low-dose CT (computed tomography) scan. A CT scan uses X-rays, or radiation, to make detailed pictures of your body. Experts recommend that screening be done in medical centers that focus on finding and treating lung cancer.  Who is screening recommended for?  Lung cancer screening is recommended for people age 50 and older who are or were heavy smokers. That means people with a smoking history of at least 20 pack years. A pack year is a way to measure how heavy a smoker you are or were.  To figure out your pack years, multiply how many packs a day on average (assuming 20 cigarettes per pack) you have smoked by how many years you have smoked. For example:  If you smoked 1 pack a day for 20 years, that's 1 times 20. So you have a smoking history of 20 pack years.  If you smoked 2 packs a day for 10 years, that's 2 times 10. So you have a smoking history of 20 pack years.  Experts agree that screening is for people who have a high risk of lung cancer. But experts don't agree on what high risk means. Some say people age 50 or older with at least a 20-pack-year smoking history are high risk. Others say it's people age 55 or older with a 30-pack-year history.  To

## 2025-04-08 NOTE — PROGRESS NOTES
Patrick Garcia is a 49 y.o. male (: 1975) presenting to address:    Chief Complaint   Patient presents with    Annual Exam     Both feet are swollen, numbness in tips of toes, started approx 2 months ago    Medication Refill       There were no vitals filed for this visit.    \"Have you been to the ER, urgent care clinic since your last visit?  Hospitalized since your last visit?\"    NO    “Have you seen or consulted any other health care providers outside of [facility] since your last visit?”    NO    “Have you had a colorectal cancer screening such as a colonoscopy/FIT/Cologuard?    NO    No colonoscopy on file  No cologuard on file  No FIT/FOBT on file   No flexible sigmoidoscopy on file            
smokeless tobacco.. Discussed with patient the risks and benefits of screening, including over-diagnosis, false positive rate, and total radiation exposure.  The patient currently exhibits no signs or symptoms suggestive of lung cancer.  Discussed with patient the importance of compliance with yearly annual lung cancer screenings and willingness to undergo diagnosis and treatment if screening scan is positive.  In addition, the patient was counseled regarding the importance of remaining smoke free and/or total smoking cessation.    Also reviewed the following if the patient has Medicare that as of February 10, 2022, Medicare only covers LDCT screening in patients aged 50-77 with at least a 20 pack-year smoking history who currently smoke or have quit in the last 15 years

## 2025-04-09 LAB
A/G RATIO: 1.5 RATIO (ref 1.1–2.6)
ALBUMIN: 4.4 G/DL (ref 3.5–5)
ALP BLD-CCNC: 71 U/L (ref 25–115)
ALT SERPL-CCNC: 33 U/L (ref 5–40)
ANION GAP SERPL CALCULATED.3IONS-SCNC: 10 MMOL/L (ref 3–15)
AST SERPL-CCNC: 25 U/L (ref 10–37)
BASOPHILS # BLD: 2 % (ref 0–2)
BASOPHILS ABSOLUTE: 0.2 K/UL (ref 0–0.2)
BILIRUB SERPL-MCNC: 0.4 MG/DL (ref 0.2–1.2)
BUN BLDV-MCNC: 11 MG/DL (ref 6–22)
CALCIUM SERPL-MCNC: 9.7 MG/DL (ref 8.4–10.5)
CHLORIDE BLD-SCNC: 101 MMOL/L (ref 98–110)
CHOLESTEROL, TOTAL: 163 MG/DL (ref 110–200)
CHOLESTEROL/HDL RATIO: 4 (ref 0–5)
CO2: 26 MMOL/L (ref 20–32)
CREAT SERPL-MCNC: 0.8 MG/DL (ref 0.5–1.2)
EOSINOPHIL # BLD: 7 % (ref 0–6)
EOSINOPHILS ABSOLUTE: 0.6 K/UL (ref 0–0.5)
GFR, ESTIMATED: >60 ML/MIN/1.73 SQ.M.
GLOBULIN: 2.9 G/DL (ref 2–4)
GLUCOSE: 182 MG/DL (ref 70–99)
HCT VFR BLD CALC: 50.2 % (ref 39.3–51.6)
HDLC SERPL-MCNC: 41 MG/DL
HEMOGLOBIN: 16.3 G/DL (ref 13.1–17.2)
LDL CHOLESTEROL: 101 MG/DL (ref 50–99)
LDL/HDL RATIO: 2.5
LYMPHOCYTES # BLD: 32 % (ref 20–45)
LYMPHOCYTES ABSOLUTE: 2.7 K/UL (ref 1–4.8)
MCH RBC QN AUTO: 33 PG (ref 26–34)
MCHC RBC AUTO-ENTMCNC: 33 G/DL (ref 31–36)
MCV RBC AUTO: 101 FL (ref 80–95)
MONOCYTES ABSOLUTE: 0.6 K/UL (ref 0.1–1)
MONOCYTES: 7 % (ref 3–12)
NEUTROPHILS ABSOLUTE: 4.3 K/UL (ref 1.8–7.7)
NEUTROPHILS SEGMENTED: 51 % (ref 40–75)
NON-HDL CHOLESTEROL: 122 MG/DL
PDW BLD-RTO: 12.4 % (ref 10–15.5)
PLATELET # BLD: 245 K/UL (ref 140–440)
PMV BLD AUTO: 10.2 FL (ref 9–13)
POTASSIUM SERPL-SCNC: 4.7 MMOL/L (ref 3.5–5.5)
RBC # BLD: 4.99 M/UL (ref 3.8–5.8)
SCREENING PSA: 0.45 NG/ML
SODIUM BLD-SCNC: 137 MMOL/L (ref 133–145)
TOTAL PROTEIN: 7.3 G/DL (ref 6.4–8.3)
TRIGL SERPL-MCNC: 103 MG/DL (ref 40–149)
VLDLC SERPL CALC-MCNC: 21 MG/DL (ref 8–30)
WBC # BLD: 8.4 K/UL (ref 4–11)

## 2025-04-10 ENCOUNTER — RESULTS FOLLOW-UP (OUTPATIENT)
Dept: FAMILY MEDICINE CLINIC | Facility: CLINIC | Age: 50
End: 2025-04-10

## 2025-04-10 LAB
CREATININE, URINE  MG/DL: 71 MG/DL
MICROALBUMIN/CREAT 24H UR: 428.3 MG/L (ref 0.1–17)
MICROALBUMIN/CREAT UR-RTO: 603.2 (ref 0–30)

## 2025-05-19 DIAGNOSIS — N18.31 TYPE 2 DIABETES MELLITUS WITH STAGE 3A CHRONIC KIDNEY DISEASE, WITH LONG-TERM CURRENT USE OF INSULIN (HCC): Primary | ICD-10-CM

## 2025-05-19 DIAGNOSIS — E11.22 TYPE 2 DIABETES MELLITUS WITH STAGE 3A CHRONIC KIDNEY DISEASE, WITH LONG-TERM CURRENT USE OF INSULIN (HCC): Primary | ICD-10-CM

## 2025-05-19 DIAGNOSIS — Z79.4 TYPE 2 DIABETES MELLITUS WITH STAGE 3A CHRONIC KIDNEY DISEASE, WITH LONG-TERM CURRENT USE OF INSULIN (HCC): Primary | ICD-10-CM

## 2025-05-19 RX ORDER — ACYCLOVIR 400 MG/1
TABLET ORAL
Qty: 3 EACH | Refills: 3 | Status: SHIPPED | OUTPATIENT
Start: 2025-05-19

## 2025-05-21 DIAGNOSIS — Z79.4 TYPE 2 DIABETES MELLITUS WITH STAGE 3A CHRONIC KIDNEY DISEASE, WITH LONG-TERM CURRENT USE OF INSULIN (HCC): Primary | ICD-10-CM

## 2025-05-21 DIAGNOSIS — E11.22 TYPE 2 DIABETES MELLITUS WITH STAGE 3A CHRONIC KIDNEY DISEASE, WITH LONG-TERM CURRENT USE OF INSULIN (HCC): Primary | ICD-10-CM

## 2025-05-21 DIAGNOSIS — N18.31 TYPE 2 DIABETES MELLITUS WITH STAGE 3A CHRONIC KIDNEY DISEASE, WITH LONG-TERM CURRENT USE OF INSULIN (HCC): Primary | ICD-10-CM

## 2025-05-29 DIAGNOSIS — J41.1 MUCOPURULENT CHRONIC BRONCHITIS (HCC): ICD-10-CM

## 2025-05-29 RX ORDER — ALBUTEROL SULFATE 90 UG/1
2 INHALANT RESPIRATORY (INHALATION) EVERY 4 HOURS PRN
Qty: 18 G | Refills: 1 | Status: SHIPPED | OUTPATIENT
Start: 2025-05-29

## 2025-07-02 DIAGNOSIS — J41.1 MUCOPURULENT CHRONIC BRONCHITIS (HCC): ICD-10-CM

## 2025-07-02 RX ORDER — ALBUTEROL SULFATE 90 UG/1
2 INHALANT RESPIRATORY (INHALATION) EVERY 4 HOURS PRN
Qty: 18 G | Refills: 1 | Status: SHIPPED | OUTPATIENT
Start: 2025-07-02

## 2025-07-02 NOTE — TELEPHONE ENCOUNTER
Last refilled 5/29/25 for 1 with 1 refill.last ov 4/8/25   Future Appointments   Date Time Provider Department Center   7/9/2025  1:00 PM Kasi Good,  BSMA BS [facility] DE[facility]

## 2025-07-09 ENCOUNTER — OFFICE VISIT (OUTPATIENT)
Dept: FAMILY MEDICINE CLINIC | Facility: CLINIC | Age: 50
End: 2025-07-09
Payer: MEDICAID

## 2025-07-09 VITALS
RESPIRATION RATE: 13 BRPM | HEART RATE: 73 BPM | OXYGEN SATURATION: 97 % | DIASTOLIC BLOOD PRESSURE: 68 MMHG | BODY MASS INDEX: 34.97 KG/M2 | WEIGHT: 249.8 LBS | SYSTOLIC BLOOD PRESSURE: 124 MMHG | HEIGHT: 71 IN | TEMPERATURE: 98.2 F

## 2025-07-09 DIAGNOSIS — E55.9 VITAMIN D DEFICIENCY: ICD-10-CM

## 2025-07-09 DIAGNOSIS — Z79.4 TYPE 2 DIABETES MELLITUS WITH STAGE 3A CHRONIC KIDNEY DISEASE, WITH LONG-TERM CURRENT USE OF INSULIN (HCC): Primary | ICD-10-CM

## 2025-07-09 DIAGNOSIS — E08.42 DIABETIC POLYNEUROPATHY ASSOCIATED WITH DIABETES MELLITUS DUE TO UNDERLYING CONDITION (HCC): ICD-10-CM

## 2025-07-09 DIAGNOSIS — E11.22 TYPE 2 DIABETES MELLITUS WITH STAGE 3A CHRONIC KIDNEY DISEASE, WITH LONG-TERM CURRENT USE OF INSULIN (HCC): Primary | ICD-10-CM

## 2025-07-09 DIAGNOSIS — N18.31 TYPE 2 DIABETES MELLITUS WITH STAGE 3A CHRONIC KIDNEY DISEASE, WITH LONG-TERM CURRENT USE OF INSULIN (HCC): Primary | ICD-10-CM

## 2025-07-09 LAB — HBA1C MFR BLD: 9.1 %

## 2025-07-09 PROCEDURE — 83036 HEMOGLOBIN GLYCOSYLATED A1C: CPT | Performed by: STUDENT IN AN ORGANIZED HEALTH CARE EDUCATION/TRAINING PROGRAM

## 2025-07-09 PROCEDURE — 99214 OFFICE O/P EST MOD 30 MIN: CPT | Performed by: STUDENT IN AN ORGANIZED HEALTH CARE EDUCATION/TRAINING PROGRAM

## 2025-07-09 PROCEDURE — 3046F HEMOGLOBIN A1C LEVEL >9.0%: CPT | Performed by: STUDENT IN AN ORGANIZED HEALTH CARE EDUCATION/TRAINING PROGRAM

## 2025-07-09 RX ORDER — INSULIN GLARGINE 100 [IU]/ML
INJECTION, SOLUTION SUBCUTANEOUS
Qty: 5 ADJUSTABLE DOSE PRE-FILLED PEN SYRINGE | Refills: 5 | Status: SHIPPED | OUTPATIENT
Start: 2025-07-09

## 2025-07-09 RX ORDER — BLOOD-GLUCOSE METER
1 KIT MISCELLANEOUS DAILY
Qty: 1 KIT | Refills: 1 | Status: SHIPPED | OUTPATIENT
Start: 2025-07-09

## 2025-07-09 RX ORDER — ERGOCALCIFEROL 1.25 MG/1
50000 CAPSULE, LIQUID FILLED ORAL WEEKLY
Qty: 12 CAPSULE | Refills: 3 | Status: SHIPPED | OUTPATIENT
Start: 2025-07-09

## 2025-07-09 ASSESSMENT — ENCOUNTER SYMPTOMS
CONSTIPATION: 0
CHEST TIGHTNESS: 0
ABDOMINAL PAIN: 0
DIARRHEA: 0
VOMITING: 0
BACK PAIN: 0
SHORTNESS OF BREATH: 0
EYE PAIN: 0
NAUSEA: 0
RHINORRHEA: 0
SORE THROAT: 0
COUGH: 0

## 2025-07-09 NOTE — PROGRESS NOTES
Patrick Garcia is a 49 y.o. male (: 1975) presenting to address:    Chief Complaint   Patient presents with    Diabetes     C/o numbness, pins and needles feeling in feet/toes       Vitals:    25 1256   BP: 124/68   Pulse: 73   Resp: 13   Temp: 98.2 °F (36.8 °C)   SpO2: 97%       \"Have you been to the ER, urgent care clinic since your last visit?  Hospitalized since your last visit?\"    NO    “Have you seen or consulted any other health care providers outside of [facility] since your last visit?”    NO    “Have you had a colorectal cancer screening such as a colonoscopy/FIT/Cologuard?    NO    No colonoscopy on file  No cologuard on file  No FIT/FOBT on file   No flexible sigmoidoscopy on file

## 2025-07-09 NOTE — PROGRESS NOTES
[facility]      MR#: [id number]    HISTORY OF PRESENT ILLNESS  History of Present Illness  The patient is a 49-year-old male who presents for a 3-month follow-up for type 2 diabetes, taking Lantus 50 units twice daily and metformin 1000 mg twice a day.    He reports that his blood sugar levels are typically high in the morning, often exceeding 200, even though he does not consume food at night. However, his blood sugar levels tend to decrease in the afternoon after eating and administering his injections, usually ranging between 140 and 160. He has not been monitoring his blood sugar levels recently due to the lack of a meter. He is currently on the lowest dose of Trulicity and does not experience any nausea or upset stomach with this medication. He also takes insulin twice daily, 40 units in the morning and 40 units at night. He experienced severe heartburn when his Ozempic dosage was increased to 1 mg, which also led to a constant feeling of fullness and occasional low blood sugar levels.    He has not yet completed his CT scan or stool test. He is requesting a prescription for vitamin D as he is allergic to milk.    He has been experiencing issues with his feet, including hitting his pinky toe twice on a chair leg, resulting in the nail coming off. He did not feel any pain from this incident. He occasionally experiences sharp, knife-like pain in his foot when lying down, but it only lasts for a minute or two and does not disrupt his sleep. He also reports a pins-and-needles sensation in his feet during sleep. He has been monitoring his feet closely due to a fear of losing them.    He is still smoking but has reduced his intake slightly. He is taking Wellbutrin to help with smoking cessation.    SOCIAL HISTORY  The patient admits to smoking.    FAMILY HISTORY  The patient's mother has pernicious anemia.    Past medical history:  Type 2 diabetes on insulin, proteinuria   Neuropathy

## 2025-07-10 DIAGNOSIS — Z79.4 TYPE 2 DIABETES MELLITUS WITH STAGE 3A CHRONIC KIDNEY DISEASE, WITH LONG-TERM CURRENT USE OF INSULIN (HCC): Primary | ICD-10-CM

## 2025-07-10 DIAGNOSIS — N18.31 TYPE 2 DIABETES MELLITUS WITH STAGE 3A CHRONIC KIDNEY DISEASE, WITH LONG-TERM CURRENT USE OF INSULIN (HCC): Primary | ICD-10-CM

## 2025-07-10 DIAGNOSIS — E11.22 TYPE 2 DIABETES MELLITUS WITH STAGE 3A CHRONIC KIDNEY DISEASE, WITH LONG-TERM CURRENT USE OF INSULIN (HCC): Primary | ICD-10-CM

## 2025-07-10 RX ORDER — BLOOD-GLUCOSE METER
1 KIT MISCELLANEOUS DAILY
Qty: 1 KIT | Refills: 0 | Status: SHIPPED | OUTPATIENT
Start: 2025-07-10

## 2025-08-06 DIAGNOSIS — J41.1 MUCOPURULENT CHRONIC BRONCHITIS (HCC): ICD-10-CM

## 2025-08-06 RX ORDER — ALBUTEROL SULFATE 90 UG/1
2 INHALANT RESPIRATORY (INHALATION) EVERY 4 HOURS PRN
Qty: 18 G | Refills: 1 | Status: CANCELLED | OUTPATIENT
Start: 2025-08-06

## 2025-08-07 DIAGNOSIS — J41.1 MUCOPURULENT CHRONIC BRONCHITIS (HCC): ICD-10-CM

## 2025-08-07 RX ORDER — ALBUTEROL SULFATE 90 UG/1
2 INHALANT RESPIRATORY (INHALATION) EVERY 4 HOURS PRN
Qty: 18 G | Refills: 1 | Status: CANCELLED | OUTPATIENT
Start: 2025-08-07

## 2025-08-19 DIAGNOSIS — J41.1 MUCOPURULENT CHRONIC BRONCHITIS (HCC): ICD-10-CM

## 2025-08-19 LAB — NONINV COLON CA DNA+OCC BLD SCRN STL QL: POSITIVE

## 2025-08-19 RX ORDER — ALBUTEROL SULFATE 90 UG/1
2 INHALANT RESPIRATORY (INHALATION) EVERY 4 HOURS PRN
Qty: 18 G | Refills: 1 | OUTPATIENT
Start: 2025-08-19

## 2025-08-26 DIAGNOSIS — F17.200 NICOTINE DEPENDENCE WITH CURRENT USE: ICD-10-CM

## 2025-08-26 DIAGNOSIS — J41.1 MUCOPURULENT CHRONIC BRONCHITIS (HCC): ICD-10-CM

## 2025-08-26 RX ORDER — ALBUTEROL SULFATE 90 UG/1
2 INHALANT RESPIRATORY (INHALATION) EVERY 4 HOURS PRN
Qty: 18 G | Refills: 1 | Status: SHIPPED | OUTPATIENT
Start: 2025-08-26

## 2025-08-26 RX ORDER — BUPROPION HYDROCHLORIDE 150 MG/1
150 TABLET, EXTENDED RELEASE ORAL 2 TIMES DAILY
Qty: 60 TABLET | Refills: 3 | Status: SHIPPED | OUTPATIENT
Start: 2025-08-26